# Patient Record
Sex: FEMALE | Race: OTHER | HISPANIC OR LATINO | Employment: UNEMPLOYED | URBAN - METROPOLITAN AREA
[De-identification: names, ages, dates, MRNs, and addresses within clinical notes are randomized per-mention and may not be internally consistent; named-entity substitution may affect disease eponyms.]

---

## 2021-05-23 ENCOUNTER — APPOINTMENT (EMERGENCY)
Dept: RADIOLOGY | Facility: HOSPITAL | Age: 26
End: 2021-05-23
Payer: COMMERCIAL

## 2021-05-23 ENCOUNTER — HOSPITAL ENCOUNTER (EMERGENCY)
Facility: HOSPITAL | Age: 26
Discharge: HOME | End: 2021-05-23
Admitting: EMERGENCY MEDICINE
Payer: COMMERCIAL

## 2021-05-23 VITALS
OXYGEN SATURATION: 97 % | DIASTOLIC BLOOD PRESSURE: 71 MMHG | SYSTOLIC BLOOD PRESSURE: 120 MMHG | HEART RATE: 104 BPM | TEMPERATURE: 98 F | RESPIRATION RATE: 18 BRPM | WEIGHT: 240.74 LBS

## 2021-05-23 DIAGNOSIS — S93.401A SPRAIN OF RIGHT ANKLE, UNSPECIFIED LIGAMENT, INITIAL ENCOUNTER: Primary | ICD-10-CM

## 2021-05-23 PROCEDURE — 73630 X-RAY EXAM OF FOOT: CPT

## 2021-05-23 PROCEDURE — 99284 EMERGENCY DEPT VISIT MOD MDM: CPT | Performed by: PHYSICIAN ASSISTANT

## 2021-05-23 PROCEDURE — 99283 EMERGENCY DEPT VISIT LOW MDM: CPT

## 2021-05-23 PROCEDURE — 73610 X-RAY EXAM OF ANKLE: CPT

## 2021-05-23 RX ORDER — ACETAMINOPHEN 325 MG/1
650 TABLET ORAL ONCE
Status: COMPLETED | OUTPATIENT
Start: 2021-05-23 | End: 2021-05-23

## 2021-05-23 RX ORDER — IBUPROFEN 600 MG/1
600 TABLET ORAL EVERY 6 HOURS PRN
Qty: 30 TABLET | Refills: 0 | Status: SHIPPED | OUTPATIENT
Start: 2021-05-23

## 2021-05-23 RX ADMIN — ACETAMINOPHEN 650 MG: 325 TABLET ORAL at 14:12

## 2021-05-23 NOTE — ED PROVIDER NOTES
History  Chief Complaint   Patient presents with   Anice Risen from the steps and twisted her right ankle     Pt feel down some steps has right ankle and foot pain , pt denies any other problems       History provided by:  Patient  Ankle Pain  Location:  Ankle and foot  Ankle location:  R ankle  Foot location:  R foot  Pain details:     Quality:  Aching    Radiates to:  Does not radiate    Severity:  Mild    Onset quality:  Sudden    Duration:  1 day    Timing:  Constant    Progression:  Unchanged  Chronicity:  New  Dislocation: no    Foreign body present:  No foreign bodies  Tetanus status:  Up to date  Prior injury to area:  No  Relieved by:  Nothing  Worsened by:  Nothing  Ineffective treatments:  None tried  Associated symptoms: no back pain and no neck pain    Risk factors: no concern for non-accidental trauma        None       History reviewed  No pertinent past medical history  History reviewed  No pertinent surgical history  History reviewed  No pertinent family history  I have reviewed and agree with the history as documented  E-Cigarette/Vaping    E-Cigarette Use Never User      E-Cigarette/Vaping Substances     Social History     Tobacco Use    Smoking status: Never Smoker    Smokeless tobacco: Never Used   Substance Use Topics    Alcohol use: Never     Frequency: Never    Drug use: Not Currently       Review of Systems   Constitutional: Negative  HENT: Negative  Eyes: Negative  Respiratory: Negative  Cardiovascular: Negative  Gastrointestinal: Negative  Endocrine: Negative  Genitourinary: Negative  Musculoskeletal: Negative  Negative for back pain and neck pain  Skin: Negative  Allergic/Immunologic: Negative  Neurological: Negative  Hematological: Negative  Psychiatric/Behavioral: Negative  All other systems reviewed and are negative  Physical Exam  Physical Exam  Vitals signs and nursing note reviewed     Constitutional:       Appearance: Normal appearance  She is normal weight  HENT:      Head: Normocephalic and atraumatic  Right Ear: Tympanic membrane, ear canal and external ear normal       Left Ear: Tympanic membrane, ear canal and external ear normal       Nose: Nose normal       Mouth/Throat:      Mouth: Mucous membranes are moist       Pharynx: Oropharynx is clear  Eyes:      Extraocular Movements: Extraocular movements intact  Conjunctiva/sclera: Conjunctivae normal       Pupils: Pupils are equal, round, and reactive to light  Neck:      Musculoskeletal: Normal range of motion and neck supple  Cardiovascular:      Rate and Rhythm: Normal rate and regular rhythm  Pulses: Normal pulses  Heart sounds: Normal heart sounds  Pulmonary:      Effort: Pulmonary effort is normal       Breath sounds: Normal breath sounds  Abdominal:      General: Abdomen is flat  Bowel sounds are normal       Palpations: Abdomen is soft  Musculoskeletal: Normal range of motion  Comments: Right ankle  Lateral and medial malleolus tender  An swelling  Dorsum tenderness   Ankle from toes from no laxity    Skin:     General: Skin is warm  Capillary Refill: Capillary refill takes less than 2 seconds  Neurological:      General: No focal deficit present  Mental Status: She is alert and oriented to person, place, and time     Psychiatric:         Mood and Affect: Mood normal          Vital Signs  ED Triage Vitals   Temperature Pulse Respirations Blood Pressure SpO2   05/23/21 1308 05/23/21 1308 05/23/21 1308 05/23/21 1308 05/23/21 1308   98 °F (36 7 °C) 104 18 120/71 97 %      Temp Source Heart Rate Source Patient Position - Orthostatic VS BP Location FiO2 (%)   05/23/21 1308 05/23/21 1308 05/23/21 1308 05/23/21 1308 --   Tympanic Monitor Sitting Left arm       Pain Score       05/23/21 1412       7           Vitals:    05/23/21 1308   BP: 120/71   Pulse: 104   Patient Position - Orthostatic VS: Sitting         Visual Acuity      ED Medications  Medications   acetaminophen (TYLENOL) tablet 650 mg (650 mg Oral Given 5/23/21 1412)       Diagnostic Studies  Results Reviewed     None                 XR ankle 3+ views RIGHT    (Results Pending)   XR foot 3+ views RIGHT    (Results Pending)              Procedures  Procedures         ED Course                             SBIRT 22yo+      Most Recent Value   SBIRT (22 yo +)   In order to provide better care to our patients, we are screening all of our patients for alcohol and drug use  Would it be okay to ask you these screening questions? No Filed at: 05/23/2021 1342                    MDM    Disposition  Final diagnoses:   Sprain of right ankle, unspecified ligament, initial encounter     Time reflects when diagnosis was documented in both MDM as applicable and the Disposition within this note     Time User Action Codes Description Comment    5/23/2021  2:23 PM Hu Nicholson  Add [S93 401A] Sprain of right ankle, unspecified ligament, initial encounter       ED Disposition     ED Disposition Condition Date/Time Comment    Discharge Stable Sun May 23, 2021  2:23 PM 4 [facility] discharge to home/self care  Follow-up Information     Follow up With Specialties Details Why Contact Info Additional Information    [address] [facility]   [address] [facility] [id number]  [address] [place] [address], [address] [place] [address], [id number] [phone]          Discharge Medication List as of 5/23/2021  2:25 PM      START taking these medications    Details   ibuprofen (MOTRIN) 600 mg tablet Take 1 tablet (600 mg total) by mouth every 6 (six) hours as needed for mild pain, Starting Sun 5/23/2021, Print           No discharge procedures on file      PDMP Review     None          ED Provider  Electronically Signed by           Mike Kruger Dioni Oviedo PA-C  05/23/21 1744

## 2021-05-24 ENCOUNTER — TELEPHONE (OUTPATIENT)
Dept: URGENT CARE | Facility: MEDICAL CENTER | Age: 26
End: 2021-05-24

## 2021-05-24 ENCOUNTER — HOSPITAL ENCOUNTER (EMERGENCY)
Facility: HOSPITAL | Age: 26
Discharge: HOME | End: 2021-05-24
Attending: EMERGENCY MEDICINE
Payer: COMMERCIAL

## 2021-05-24 ENCOUNTER — TELEPHONE (OUTPATIENT)
Dept: OBGYN CLINIC | Facility: HOSPITAL | Age: 26
End: 2021-05-24

## 2021-05-24 VITALS
OXYGEN SATURATION: 98 % | DIASTOLIC BLOOD PRESSURE: 76 MMHG | SYSTOLIC BLOOD PRESSURE: 121 MMHG | RESPIRATION RATE: 18 BRPM | TEMPERATURE: 98.7 F | HEART RATE: 92 BPM

## 2021-05-24 DIAGNOSIS — S82.891A ANKLE FRACTURE, RIGHT: Primary | ICD-10-CM

## 2021-05-24 PROCEDURE — 99283 EMERGENCY DEPT VISIT LOW MDM: CPT

## 2021-05-24 PROCEDURE — 99284 EMERGENCY DEPT VISIT MOD MDM: CPT | Performed by: PHYSICIAN ASSISTANT

## 2021-05-24 PROCEDURE — 29515 APPLICATION SHORT LEG SPLINT: CPT | Performed by: PHYSICIAN ASSISTANT

## 2021-05-24 NOTE — TELEPHONE ENCOUNTER
Contacted pt and talke to   Discussed radiology reading Pt will call ortho for follow and will return to er for splint and crutches today

## 2021-05-26 ENCOUNTER — OFFICE VISIT (OUTPATIENT)
Dept: OBGYN CLINIC | Facility: MEDICAL CENTER | Age: 26
End: 2021-05-26
Payer: COMMERCIAL

## 2021-05-26 ENCOUNTER — APPOINTMENT (OUTPATIENT)
Dept: RADIOLOGY | Facility: MEDICAL CENTER | Age: 26
End: 2021-05-26
Payer: COMMERCIAL

## 2021-05-26 VITALS
HEIGHT: 64 IN | HEART RATE: 89 BPM | DIASTOLIC BLOOD PRESSURE: 75 MMHG | BODY MASS INDEX: 39.95 KG/M2 | WEIGHT: 234 LBS | SYSTOLIC BLOOD PRESSURE: 113 MMHG

## 2021-05-26 DIAGNOSIS — S82.839A AVULSION FRACTURE OF DISTAL END OF FIBULA: Primary | ICD-10-CM

## 2021-05-26 DIAGNOSIS — S93.491A SPRAIN OF ANTERIOR TALOFIBULAR LIGAMENT OF RIGHT ANKLE, INITIAL ENCOUNTER: ICD-10-CM

## 2021-05-26 DIAGNOSIS — S82.839A AVULSION FRACTURE OF DISTAL END OF FIBULA: ICD-10-CM

## 2021-05-26 PROCEDURE — 99203 OFFICE O/P NEW LOW 30 MIN: CPT | Performed by: EMERGENCY MEDICINE

## 2021-05-26 PROCEDURE — 73590 X-RAY EXAM OF LOWER LEG: CPT

## 2021-05-26 NOTE — PATIENT INSTRUCTIONS
Please use the walking boot when ambulating, however you may take the boot off when relaxing or sleeping  Follow up in 2 weeks  Fractura de tobillo   LO QUE NECESITA SABER:   Ángel fractura de tobillo es la [place] de 1 o más huesos de campbell tobillo  INSTRUCCIONES SOBRE EL [facility]:   Llame al [employer] de emergencias local (911 en los [place]) en cualquiera de los siguientes casos:  · Usted se siente Lior, le hace falta el aire y tiene dolor de [place]  · Usted expectora lubna  Regrese a la jalil de emergencias si:  · Campbell pierna se siente cálida, sensible y adolorida  Se podría hussain inflamado y ruiz  · La lubna empapa el vendaje  · Usted tiene dolor intenso en el tobillo  · El yeso se siente muy apretado  · Campbell pie o dedos del pie están fríos o entumecidos  · El pie o las uñas del pie se ponen azules o grises  Llame a campbell médico si:  · Campbell férula se siente muy ajustada  · Campbell inflamación barnett aumentado o regresado  · Tiene fiebre  · El dolor no desaparece, incluso después del [place]  · Usted tiene preguntas o inquietudes acerca de campbell condición o cuidado  Medicamentos: Es posible que usted necesite alguno de los siguientes:  · Acetaminofén mitch el dolor y baja la fiebre  Está disponible sin receta médica  Pregunte la cantidad y la frecuencia con que debe tomarlos  [address]  Beena las etiquetas de todos los demás medicamentos que esté usando para saber si también contienen acetaminofén, o pregunte a campbell médico o farmacéutico  El acetaminofén puede causar daño en el hígado cuando no se ericka de forma correcta  No use más de 4 gramos (4000 miligramos) en total de acetaminofeno en un día  · [place], pee el ibuprofeno, [place] [facility] a disminuir la inflamación, el dolor y la [place]  [place] medicamento está disponible con o sin blanca receta médica  Los CECE pueden causar sangrado estomacal o problemas renales en ciertas personas   Si usted ericka un medicamento anticoagulante, siempre pregúntele a campbell médico si los CECE son seguros para usted  Siempre adiel la etiqueta de digna medicamento y [place] instrucciones  · Puede administrarse podrían administrarse  Pregunte al médico cómo debe juanis digna medicamento de forma bello  Algunos medicamentos recetados para el dolor contienen acetaminofén  No tome otros medicamentos que contengan acetaminofén sin consultarlo con campbell médico  Demasiado acetaminofeno puede causar daño al hígado  Los medicamentos recetados para el dolor podrían causar estreñimiento  Pregunte a campbell médico pee prevenir o tratar estreñimiento  · Bastian nori medicamentos pee se le haya indicado  Consulte con campbell médico si usted sheri que campbell medicamento no le está ayudando o si presenta efectos secundarios  Infórmele si es alérgico a cualquier medicamento  Mantenga blanca lista actualizada de los [place], las vitaminas y los productos herbales que ericka  Incluya los siguientes datos de los medicamentos: cantidad, frecuencia y motivo de administración  Traiga con usted la lista o los envases de las píldoras a nori citas de seguimiento  Lleve la lista de los medicamentos con usted en tone de blanca emergencia  Acuda a blanca consulta de control con campbell médico dentro de 1 a 2 días: Es posible que la fractura necesite reducirse (los [employer] se colocan en campbell sitio) o que [place]  Anote nori preguntas para que se acuerde de hacerlas ed nori visitas  Dispositivos de apoyo: Lacinda Bal un aparato ortopédico, un yeso o blanca férula para limitar campbell movimiento y proteger el tobillo  Es posible que usted necesite usar [employer] para proteger el tobillo y disminuir el dolor mientras se desplaza  No quite el dispositivo y no coloque peso sobre el tobillo lesionado  El cuidado de campbell aparato ortopédico y de campbell yeso: [place] el aparato ortopédico o el yeso antes de bañarse para que no se mojen   Ponga cinta adhesiva en 2 bolsas de plástico sobre campbell piel para cubrir por encima del yeso  Trate de mantener campbell tobillo fuera del agua el mayor tiempo posible  Descanse: Repose el tobillo para que pueda sanar  Regrese a nori actividades cotidianas según las indicaciones  Hielo: Aplique hielo sobre campbell tobillo por 15 a 20 minutos cada hora o pee se le indique  Use blanca compresa de hielo o ponga hielo triturado en blanca bolsa de plástico  Mohinder Boateng con blanca toalla  El hielo ayuda a evitar daño al tejido y a disminuir la inflamación y el dolor  EleveLangley Me campbell tobillo arriba del nivel del corazón tan seguido pee pueda  [place] va a disminuir inflamación y el dolor  Apoye el tobillo sobre almohadas o cobijas para mantenerlo elevado cómodamente  © Copyright [address] [place] [address] Drive Information is for End User's use only and may not be sold, redistributed or otherwise used for commercial purposes  All illustrations and images included in CareNotes® are the copyrighted property of [employer]  or [address] [place] [address] es sólo para uso en educación  Cmapbell intención no es darle un consejo médico sobre enfermedades o tratamientos  Colsulte con campbell Martínez Revering farmacéutico antes de seguir cualquier régimen médico para saber si es seguro y efectivo para usted

## 2021-05-26 NOTE — PROGRESS NOTES
Assessment/Plan:    Diagnoses and all orders for this visit:    Avulsion fracture of distal end of fibula  -     XR tibia fibula 2 vw right; Future  -     Cam Boot    Sprain of anterior talofibular ligament of right ankle, initial encounter  -     XR tibia fibula 2 vw right; Future  -     Cam Boot    Inversion injury small avulsion fx tip of distal fib  CAM boot for WBAT may come out of boot when relaxing  Xrays ankle and foot reviewed  Reviewed Xray tib/fib obtained today wnl  T/C PT    Return in about 2 weeks (around 6/9/2021)  Chief Complaint:     Chief Complaint   Patient presents with    Right Ankle - Pain       Subjective:   Patient ID: Clover Long is a 32 y o  female  New patient presents for right ankle inversion injury after missing the last 2 steps was evaluated in ER twice Xrays obtained and reviewed today she was placed in posterior orthoglass splint after second visit  C/o lateral pain and swelling  Review of Systems    The following portions of the patient's chart were reviewed and updated as appropriate: Allergy:    Allergies   Allergen Reactions    Pineapple - Food Allergy Swelling       History reviewed  No pertinent past medical history  History reviewed  No pertinent surgical history      Social History     Socioeconomic History    Marital status: /Civil Union     Spouse name: Not on file    Number of children: Not on file    Years of education: Not on file    Highest education level: Not on file   Occupational History    Not on file   Social Needs    Financial resource strain: Not on file    Food insecurity     Worry: Not on file     Inability: Not on file   [employer] needs     Medical: Not on file     Non-medical: Not on file   Tobacco Use    Smoking status: Never Smoker    Smokeless tobacco: Never Used   Substance and Sexual Activity    Alcohol use: Never     Frequency: Never    Drug use: Not Currently    Sexual activity: Not on file   Lifestyle    Physical activity     Days per week: Not on file     Minutes per session: Not on file    Stress: Not on file   Relationships    Social connections     Talks on phone: Not on file     Gets together: Not on file     Attends Taoism service: Not on file     Active member of club or organization: Not on file     Attends meetings of clubs or organizations: Not on file     Relationship status: Not on file    Intimate partner violence     Fear of current or ex partner: Not on file     Emotionally abused: Not on file     Physically abused: Not on file     Forced sexual activity: Not on file   Other Topics Concern    Not on file   Social History Narrative    Not on file       History reviewed  No pertinent family history  Medications:    Current Outpatient Medications:     ibuprofen (MOTRIN) 600 mg tablet, Take 1 tablet (600 mg total) by mouth every 6 (six) hours as needed for mild pain, Disp: 30 tablet, Rfl: 0    There is no problem list on file for this patient  Objective:  /75   Pulse 89   Ht 5' 4" (1 626 m)   Wt 106 kg (234 lb)   LMP 05/12/2021   BMI 40 17 kg/m²     Right Ankle Exam     Tenderness   The patient is experiencing tenderness in the ATF and lateral malleolus  Swelling: moderate    Range of Motion   Eversion: abnormal   Inversion: abnormal     Other   Erythema: absent  Sensation: normal  Pulse: present     Comments:  Ttp mid fibula            Physical Exam      Neurologic Exam    Procedures    I have personally reviewed the written report and IMAGES of the pertinent studies  RIGHT FOOT; RIGHT ANKLE  INDICATION:   pain  COMPARISON:  None     VIEWS:  XR FOOT 3+ VW RIGHT, XR ANKLE 3+ VW RIGHT      FINDINGS:     Small avulsion fracture involves the tip of the fibula    There is also a linear lucency involving the lateral aspect of the tibia near the growth arrest line seen only on the oblique view although there is some slight cortical irregularity noted on the   posterior aspect of the tibia on the lateral view      No significant degenerative changes      No lytic or blastic osseous lesion      Soft tissue swelling     IMPRESSION:     Small avulsion fracture the tip of the fibula      Possible nondisplaced fracture of the distal tibia laterally

## 2021-06-16 ENCOUNTER — OFFICE VISIT (OUTPATIENT)
Dept: OBGYN CLINIC | Facility: MEDICAL CENTER | Age: 26
End: 2021-06-16
Payer: COMMERCIAL

## 2021-06-16 VITALS
HEIGHT: 64 IN | WEIGHT: 234 LBS | BODY MASS INDEX: 39.95 KG/M2 | DIASTOLIC BLOOD PRESSURE: 79 MMHG | SYSTOLIC BLOOD PRESSURE: 125 MMHG | HEART RATE: 103 BPM

## 2021-06-16 DIAGNOSIS — S82.839A AVULSION FRACTURE OF DISTAL END OF FIBULA: Primary | ICD-10-CM

## 2021-06-16 DIAGNOSIS — S93.491D SPRAIN OF ANTERIOR TALOFIBULAR LIGAMENT OF RIGHT ANKLE, SUBSEQUENT ENCOUNTER: ICD-10-CM

## 2021-06-16 PROCEDURE — 99213 OFFICE O/P EST LOW 20 MIN: CPT | Performed by: EMERGENCY MEDICINE

## 2021-06-16 NOTE — PROGRESS NOTES
Assessment/Plan:    Diagnoses and all orders for this visit:    Avulsion fracture of distal end of fibula  -     Ankle Cude ankle/Ankle Brace    Sprain of anterior talofibular ligament of right ankle, subsequent encounter  -     Ankle Cude ankle/Ankle Brace    Used language line  Patient improving, will D/C CAM boot we have provided ankle brace    Return in about 4 weeks (around 7/14/2021)  Chief Complaint:     Chief Complaint   Patient presents with    Right Ankle - Fracture, Follow-up       Subjective:   Patient ID: Soheila Price is a 32 y o  female  DOI 5/23    Patient returns   CAM WBAT    Initial note:   New patient presents for right ankle inversion injury after missing the last 2 steps was evaluated in ER twice Xrays obtained and reviewed today she was placed in posterior orthoglass splint after second visit  C/o lateral pain and swelling  Review of Systems    The following portions of the patient's chart were reviewed and updated as appropriate: Allergy:    Allergies   Allergen Reactions    Pineapple - Food Allergy Swelling       History reviewed  No pertinent past medical history  History reviewed  No pertinent surgical history      Social History     Socioeconomic History    Marital status: /Civil Union     Spouse name: Not on file    Number of children: Not on file    Years of education: Not on file    Highest education level: Not on file   Occupational History    Not on file   Tobacco Use    Smoking status: Never Smoker    Smokeless tobacco: Never Used   Vaping Use    Vaping Use: Never used   Substance and Sexual Activity    Alcohol use: Never    Drug use: Not Currently    Sexual activity: Not on file   Other Topics Concern    Not on file   Social History Narrative    Not on file     Social Determinants of Health     Financial Resource Strain:     Difficulty of Paying Living Expenses:    Food Insecurity:     Worried About Running Out of Food in the Last Year:    [address] in the Last Year:    Transportation Needs:     Lack of Transportation (Medical):  Lack of Transportation (Non-Medical):    Physical Activity:     Days of Exercise per Week:     Minutes of Exercise per Session:    Stress:     Feeling of Stress :    Social Connections:     Frequency of Communication with Friends and Family:     Frequency of Social Gatherings with Friends and Family:     Attends Scientologist Services:     Active Member of Clubs or Organizations:     Attends Club or Organization Meetings:     Marital Status:    Intimate Partner Violence:     Fear of Current or Ex-Partner:     Emotionally Abused:     Physically Abused:     Sexually Abused:        History reviewed  No pertinent family history  Medications:    Current Outpatient Medications:     ibuprofen (MOTRIN) 600 mg tablet, Take 1 tablet (600 mg total) by mouth every 6 (six) hours as needed for mild pain, Disp: 30 tablet, Rfl: 0    There is no problem list on file for this patient  Objective:  /79   Pulse 103   Ht 5' 4" (1 626 m)   Wt 106 kg (234 lb)   LMP 05/12/2021   BMI 40 17 kg/m²     Right Ankle Exam     Tenderness   The patient is experiencing tenderness in the ATF  Swelling: mild    Range of Motion   The patient has normal right ankle ROM  Tests   Varus tilt: positive    Other   Erythema: absent  Sensation: normal     Comments: There is no tenderness to palpation of the lisfranc, and no plantar ecchymosis  There is no tenderness to palpation of the proximal and mid fibula  Physical Exam      Neurologic Exam    Procedures    I have personally reviewed the written report of the pertinent studies

## 2021-07-14 ENCOUNTER — OFFICE VISIT (OUTPATIENT)
Dept: OBGYN CLINIC | Facility: MEDICAL CENTER | Age: 26
End: 2021-07-14
Payer: COMMERCIAL

## 2021-07-14 VITALS
SYSTOLIC BLOOD PRESSURE: 96 MMHG | BODY MASS INDEX: 39.61 KG/M2 | HEART RATE: 91 BPM | DIASTOLIC BLOOD PRESSURE: 64 MMHG | WEIGHT: 232 LBS | HEIGHT: 64 IN

## 2021-07-14 DIAGNOSIS — S82.839A AVULSION FRACTURE OF DISTAL END OF FIBULA: Primary | ICD-10-CM

## 2021-07-14 DIAGNOSIS — S93.491D SPRAIN OF ANTERIOR TALOFIBULAR LIGAMENT OF RIGHT ANKLE, SUBSEQUENT ENCOUNTER: ICD-10-CM

## 2021-07-14 PROCEDURE — 99213 OFFICE O/P EST LOW 20 MIN: CPT | Performed by: EMERGENCY MEDICINE

## 2021-07-14 NOTE — PROGRESS NOTES
Assessment/Plan:    Diagnoses and all orders for this visit:    Avulsion fracture of distal end of fibula  -     Ambulatory referral to Physical Therapy; Future    Sprain of anterior talofibular ligament of right ankle, subsequent encounter  -     Ambulatory referral to Physical Therapy; Future    Patient improving, continue ankle brace PRN, PT for strengthening    Return in about 5 weeks (around 8/18/2021)  Chief Complaint:   No chief complaint on file  F/U right ankle injury    Subjective:   Patient ID: Ryan Vieira is a 32 y o  female  DOI 5/23    Patient returns 7 weeks out from injury, last eval D/C'd Cam and provided ankle brace  She will experience lateral pain with walking 5/10, however this does not keep her from ADLs  Initial note:   New patient presents for right ankle inversion injury after missing the last 2 steps was evaluated in ER twice Xrays obtained and reviewed today she was placed in posterior orthoglass splint after second visit  C/o lateral pain and swelling  Review of Systems    The following portions of the patient's chart were reviewed and updated as appropriate: Allergy:    Allergies   Allergen Reactions    Pineapple - Food Allergy Swelling       History reviewed  No pertinent past medical history  History reviewed  No pertinent surgical history      Social History     Socioeconomic History    Marital status: /Civil Union     Spouse name: Not on file    Number of children: Not on file    Years of education: Not on file    Highest education level: Not on file   Occupational History    Not on file   Tobacco Use    Smoking status: Never Smoker    Smokeless tobacco: Never Used   Vaping Use    Vaping Use: Never used   Substance and Sexual Activity    Alcohol use: Never    Drug use: Not Currently    Sexual activity: Not on file   Other Topics Concern    Not on file   Social History Narrative    Not on file     Social Determinants of Health     Financial Resource Strain:     Difficulty of Paying Living Expenses:    Food Insecurity:     Worried About Running Out of Food in the Last Year:     [address] in the Last Year:    Transportation Needs:     Lack of Transportation (Medical):  Lack of Transportation (Non-Medical):    Physical Activity:     Days of Exercise per Week:     Minutes of Exercise per Session:    Stress:     Feeling of Stress :    Social Connections:     Frequency of Communication with Friends and Family:     Frequency of Social Gatherings with Friends and Family:     Attends Rastafarian Services:     Active Member of Clubs or Organizations:     Attends Club or Organization Meetings:     Marital Status:    Intimate Partner Violence:     Fear of Current or Ex-Partner:     Emotionally Abused:     Physically Abused:     Sexually Abused:        History reviewed  No pertinent family history  Medications:    Current Outpatient Medications:     ibuprofen (MOTRIN) 600 mg tablet, Take 1 tablet (600 mg total) by mouth every 6 (six) hours as needed for mild pain, Disp: 30 tablet, Rfl: 0    There is no problem list on file for this patient  Objective:  BP 96/64   Pulse 91   Ht 5' 4" (1 626 m)   Wt 105 kg (232 lb)   LMP 05/12/2021   BMI 39 82 kg/m²     Right Ankle Exam     Tenderness   The patient is experiencing tenderness in the CF  Range of Motion   The patient has normal right ankle ROM  Muscle Strength   The patient has normal right ankle strength  Tests   Varus tilt: negative    Other   Erythema: absent  Sensation: normal  Pulse: present           Strength/Myotome Testing     Right Ankle/Foot   Normal strength      Physical Exam      Neurologic Exam    Procedures    I have personally reviewed the written report of the pertinent studies

## 2021-07-14 NOTE — PATIENT INSTRUCTIONS
Recommend Spenco arch supports (green and black) on [employer]  Wean into wearing them, as your feet will need to get use to them  Ejercicios para el tobillo   CUIDADO AMBULATORIO:   Lo que necesita saber acerca de los ejercicios para el tobillo: Los ejercicios para el tobillo ayudan a fortalecer campbell tobillo y a mejorar campbell función después de blanca Kerry Rucks  Estos son Dyan Joni básicos  Pregúntele a campbell médico si usted necesita acudir con un fisioterapeuta para que le indique ejercicios más avanzado  · Realice estos ejercicios entre 3 a 5 días a la [place] , o según las indicaciones de campbell médico  Pregunte si debería realizar los ejercicios con ambos tobillos  · Realice los ejercicios en el orden que le recomiende campbell médico para prevenir la inflamación, el dolor crónico y volverse a lesionar  Empiece con los ejercicios del rango de [place]  Deborah Robert a los ejercicios de fortalecimiento y finalmente a los de ejercicios de estabilidad  · Entre en calor antes de hacer los ejercicios para el tobillo  Camine o monte en blanca bicicleta estática ed 5 a 10 minutos para preparase a  campbell tobillo  · Deténgase si siente dolor  Es normal que sienta cierta molestia al principio  Practicar los ejercicios con regularidad ayudará a disminuir campbell incomodidad con el paso del [place]  Cómo realizar los ejercicios de rango de movimiento de forma bello: Empiece con los ejercicios del rango de movimiento para mejorar la flexibilidad  Pregúntele a campbell médico cuándo puede comenzar a realizar los ejercicios de fortalecimiento  · El abecedario con el tobillo: Siéntese en blanca silla en la cual nori pies no toquen el piso  Utilice campbell dedo ronit del pie para trazar cada letra del abecedario  Utilice sólo campbell pie y tobillo y [employer] movimientos pequeños  Willis 2 series  · Estiramiento de la pantorrilla:     ? Sentado estiramiento para la pantorrilla con blanca toalla   Siéntese en el [address] [place] suyo  Pase blanca toalla en forma de U alrededor de la planta del pie lesionado  Agarre el final de cada extremo de la toalla y tráigala hacia el tronco  Mantenga campbell pierna y espalda derecha  No se incline hacia clay mientras albertina de la toalla  Sostenga está posición por 30 segundos  Luego relaje por 30 segundos  Realice 2 series de 10          ? De pie, estiramiento de la pantorrilla: Párese frente a blanca pared con el pie rakesh hacia clay y la rodilla ligeramente doblada  Mantenga la pierna del pie lesionado extendida y detrás suyo con los dedos de los pies apuntando un poco [place]  [place] los dos talones contra el piso y presione nori caderas hacia clay  Mantenga campbell espalda derecha sin arquearla  Sostenga por 30 segundos  Luego relaje por 30 segundos  Realice 2 series de 10  Repita con la pierna doblada  Realice 2 series de 10  Cómo realizar ejercicios de fortalecimiento de manera bello: Después que usted pueda realizar los ejercicios del rango de movimiento sin dolor, puede empezar con los ejercicios de fortalecimiento  Pregúntele a campbell médico cuándo puede comenzar con los ejercicios de estabilidad o equilibrio  · Movimientos del tobillo en 4 direcciones: Siéntese en el piso con las piernas enfrente suyo  [place] soporte [employer] talones en el suelo  ? Flexión dorsal: Empiece con los dedos de los pies mirando [place]  Traiga nori dedos de los pies [place] campbell cuerpo  Despacio regrese a la posición inicial  Realice 3 series de 5     ? Flexión plantar: Empiece con los dedos de los pies mirando [employer]  Empuje nori dedos hacia adelante de usted  Despacio regrese a la posición inicial  Realice 3 series de 5          ? Inversión: Empiece con los dedos de los pies mirando [employer]  Mueva nori dedos [employer], mirándose el jeremy al [place]  Despacio regrese a la posición inicial  Realice 3 series de 5     ? Eversión: [employer] con los dedos de los pies mirando [place] arriba   Mueva nori dedos hacia afuera, alejados el jeremy del [place]  Despacio regrese a la posición inicial  Realice 3 series de 5  · Flexión de los dedos del pie con blanca toalla: Sentado en un silla con los dos pies contra el piso  Coloque blanca toalla pequeña en el piso enfrente de campbell pie lesionado  Agarre el centro de la toalla con los dedos de pie y traiga la toalla [place] usted  Relájese y repita  Willis 1 serie de 5          · Levantar las canicas: Sentado en un silla con los dos pies contra el piso  Coloque 20 canicas en el piso enfrente de campbell pie lesionado  Utilice nori dedos para levantar blanca canica a la vez para colocarla dentro de blanca vasija  Repita hasta que haya recogido todas las canicas  Realice 1 serie  · Levantamiento de talones:     ? Levantada de talones: De pie con el peso de campbell cuerpo distribuido igualmente en ambos pies  Agárrese del espaldar de blanca silla o de blanca pared para mantener el equilibrio  Levante el pie rakesh del piso para que campbell peso se vuelque al pie lesionado  Levante del piso el talón de campbell pie lesionado tanto pee pueda  Despacio baje campbell talón al piso  Willis 1 serie de 10          ? Doble levantada de los talones: De pie con el peso de campbell cuerpo distribuido igualmente en ambos pies  Agárrese del espaldar de blanca silla o de blanca pared para mantener el equilibrio  Levante del piso nori dos talones tanto pee pueda  Despacio baje nori talones al piso  Willis 1 serie de 10  · Caminar sobre el talón y en puntillas:     ? Caminar sobre el talón: Empezar en posición de pie  Levante nori dedos de los pies y camine sobre nori [place]  Mantenga los dedos levantados tanto pee le sea posible  Realice 2 series de 10          ? Caminar en puntillas: Empezar en posición de pie  Levante nori talones del piso y camine sobre la planta de nori dedos de los pies  Mantenga nori talones levantados [employer] sea posible  Realice 2 series de 10         Cómo realizar un ejercicio de equilibrio de forma bello: Después que [facility] [employer] ejercicios de acondicionamiento sin dolor, usted puede empezar con estos ejercicios básicos de estabilidad  [place] a campbell médico ejercicios de estabilidad más avanzados  · Levantamiento de blanca sridevi pierna: De pie con el peso de campbell cuerpo distribuido igualmente en ambos pies o agárrese del espaldar de blanca silla o de blanca pared  No se incline hacia un lado  Levante el pie rakesh del piso para que campbell peso se vuelque al pie lesionado  Sostenga el equilibrio sobre campbell [employer]  Pregúntele a campbell médico por cuánto tiempo tiene que [employer]  Comuníquese con campbell médico si:  · Campbell dolor empeora  · Usted tiene un dolor nuevo  · Usted tiene preguntas o inquietudes acerca de campbell afección, cuidado o programa de ejercicios  © Copyright [address] [place] [address] Information is for End User's use only and may not be sold, redistributed or otherwise used for commercial purposes  All illustrations and images included in CareNotes® are the copyrighted property of [employer]  or [address] [place] [address] es sólo para uso en educación  Campbell intención no es darle un consejo médico sobre enfermedades o tratamientos  Colsulte con campbell Caroline Sergio farmacéutico antes de seguir cualquier régimen médico para saber si es seguro y efectivo para usted

## 2021-07-19 ENCOUNTER — EVALUATION (OUTPATIENT)
Dept: PHYSICAL THERAPY | Facility: CLINIC | Age: 26
End: 2021-07-19
Payer: COMMERCIAL

## 2021-07-19 DIAGNOSIS — S93.491D SPRAIN OF ANTERIOR TALOFIBULAR LIGAMENT OF RIGHT ANKLE, SUBSEQUENT ENCOUNTER: ICD-10-CM

## 2021-07-19 DIAGNOSIS — S82.839A AVULSION FRACTURE OF DISTAL END OF FIBULA: Primary | ICD-10-CM

## 2021-07-19 PROCEDURE — 97110 THERAPEUTIC EXERCISES: CPT | Performed by: PHYSICAL THERAPIST

## 2021-07-19 PROCEDURE — 97161 PT EVAL LOW COMPLEX 20 MIN: CPT | Performed by: PHYSICAL THERAPIST

## 2021-07-19 NOTE — PROGRESS NOTES
PT Evaluation     Today's date: 2021  Patient name: Lizabeth Garcia  : 1995  MRN: [id number]  Referring provider: Kevni Vargas MD  Dx:   Encounter Diagnosis     ICD-10-CM    1  Avulsion fracture of distal end of fibula  S82 839A Ambulatory referral to Physical Therapy   2  Sprain of anterior talofibular ligament of right ankle, subsequent encounter  S93 [id number] Ambulatory referral to Physical Therapy                  Assessment  Assessment details: Lizabeth Garcia is a pleasant 32 y o  female who presents with signs and symptoms correlating with referring diagnosis  No further referral appears necessary at this time based upon examination results  The patient's greatest concerns are decreasing pain throughout the day and ensuring that she is progressing to  She presents with a movement impairment diagnosis of hypomobile DF assistance  This also presents with decreased PF ROM 2/2 pain in the anterior portion of the TCJ, good strength, and overall balance with and without the splint  Negative prognostic indicators: high copay  Positive prognostic indicators: good motivation  Please contact me if you have any further questions or recommendations  Thank you very much for the kind referral       Impairments: abnormal or restricted ROM, abnormal movement, activity intolerance, impaired physical strength and pain with function    Symptom irritability: lowUnderstanding of Dx/Px/POC: good   Prognosis: good    Goals  STGs  1  Decrease pain by 30% in 2-4 weeks  2  Improve ankle ROM by 5 degrees in 2-4 weeks  3  Improve ankle strength by 1/3 grade in 2-4 weeks  LTGs  1  Decrease pain by 60% in 6-8 weeks  2  Improve walking tolerance to >30 minutes in 6-8 weeks  3  Perform job activities without pain in 6-8 weeks          Plan  Patient would benefit from: skilled physical therapy  Referral necessary: No  Planned modality interventions: cryotherapy, TENS and thermotherapy: hydrocollator packs  Planned therapy interventions: manual therapy, therapeutic training, stretching, strengthening, therapeutic activities, therapeutic exercise, patient education, activity modification, neuromuscular re-education and home exercise program  Frequency: 2x month  Duration in weeks: 5  Treatment plan discussed with: patient        Subjective Evaluation    History of Present Illness  Mechanism of injury: Pt is a 32 y o  female presenting w/ R ankle sprain and avulsion fracture of distal fibula following a fall down her stairs 2 months ago  She states that since then the ankle has been feeling substantially better and is able to ambulate with and without the splint  She is ambulating, and occasionally is having increased pain while walking  She occasionally notices swelling, but Is not everyday       Neurological signs: none   Red Flags: none   PMH: none          Not a recurrent problem   Quality of life: good    Pain  Current pain rating: 3  At best pain rating: 3  At worst pain rating: 3  Location: lateral ankle and anterior TCJ   Quality: sharp  Relieving factors: ice  Aggravating factors: walking, standing and stair climbing  Progression: improved    Social Support  Steps to enter house: yes (2nd floor)    Employment status: not working  Patient Goals  Patient goals for therapy: increased strength, decreased pain, increased motion and independence with ADLs/IADLs          Objective     Active Range of Motion   Left Ankle/Foot   Dorsiflexion (kf): 4 degrees   Plantar flexion: 46 degrees   Inversion: 30 degrees   Eversion: 20 degrees     Right Ankle/Foot   Dorsiflexion (kf): 0 degrees   Plantar flexion: 38 degrees with pain  Inversion: 23 degrees   Eversion: 18 degrees     Additional Active Range of Motion Details  Observation: no swelling in the ankle at this time  Gait: normal ambulation pattern       Strength/Myotome Testing     Left Ankle/Foot   Dorsiflexion: 4+  Plantar flexion: 4+  Inversion: 4+  Eversion: 4+    Right Ankle/Foot   Dorsiflexion: 4+  Plantar flexion: 4+  Inversion: 4+  Eversion: 4-    Tests     Right Ankle/Foot   Positive for anterior drawer                Precautions: none    Daily Treatment Diary    Date 7/19       Visit Number 1       FOTO IE       Re-Eval IE          Manuals    DF and PA oscillation MK                                Neuro Re-Ed     Doming  HEP       Towel curls        HR/TR HEP       SLS with foam                                 Ther Ex    TM         Ankle TB 4 way  HEP       Slant board         lunge        Squats                                Ther Activity    Steps                                 Modalities

## 2022-09-20 ENCOUNTER — OFFICE VISIT (OUTPATIENT)
Dept: FAMILY MEDICINE CLINIC | Facility: CLINIC | Age: 27
End: 2022-09-20

## 2022-09-20 VITALS
SYSTOLIC BLOOD PRESSURE: 110 MMHG | TEMPERATURE: 97.1 F | RESPIRATION RATE: 19 BRPM | BODY MASS INDEX: 42.51 KG/M2 | WEIGHT: 249 LBS | DIASTOLIC BLOOD PRESSURE: 76 MMHG | HEART RATE: 83 BPM | OXYGEN SATURATION: 97 % | HEIGHT: 64 IN

## 2022-09-20 DIAGNOSIS — E66.01 CLASS 3 SEVERE OBESITY DUE TO EXCESS CALORIES WITHOUT SERIOUS COMORBIDITY WITH BODY MASS INDEX (BMI) OF 40.0 TO 44.9 IN ADULT (HCC): ICD-10-CM

## 2022-09-20 DIAGNOSIS — Z59.9 FINANCIAL DIFFICULTIES: ICD-10-CM

## 2022-09-20 DIAGNOSIS — Z00.00 PHYSICAL EXAM: Primary | ICD-10-CM

## 2022-09-20 PROBLEM — E66.813 CLASS 3 SEVERE OBESITY DUE TO EXCESS CALORIES WITHOUT SERIOUS COMORBIDITY WITH BODY MASS INDEX (BMI) OF 40.0 TO 44.9 IN ADULT (HCC): Status: ACTIVE | Noted: 2022-09-20

## 2022-09-20 PROCEDURE — 99203 OFFICE O/P NEW LOW 30 MIN: CPT | Performed by: FAMILY MEDICINE

## 2022-09-20 PROCEDURE — 3725F SCREEN DEPRESSION PERFORMED: CPT | Performed by: FAMILY MEDICINE

## 2022-09-20 SDOH — ECONOMIC STABILITY - INCOME SECURITY: PROBLEM RELATED TO HOUSING AND ECONOMIC CIRCUMSTANCES, UNSPECIFIED: Z59.9

## 2022-09-20 NOTE — PROGRESS NOTES
[address] [facility]    NAME: Elza Johnson  AGE: 32 y o  SEX: female  : 1995     DATE: 2022     Assessment and Plan:     Problem List Items Addressed This Visit        Other    Financial difficulties    Relevant Orders    Ambulatory referral to social work care management program    Class 3 severe obesity due to excess calories without serious comorbidity with body mass index (BMI) of 40 0 to 44 9 in adult [facility])     Assessment:   BMI 42 74, obese class 3   Goal: BMI between 18 5 to 25  Plan  Will refer the patient to weight management  Encourage the patient to loose at least 5 pounds before next visit  Counseled patient on the importance of working to achieve weight reduction goal  Discussed benefits of weight loss including prevention or control of comorbidities  Discussed role that balanced diet and daily activity play in weight reduction  Set up small attainable weight loss goals  Involve family, friends, and co-workers for support  Exercise should be 30 minutes 5 times weekly of moderate intensity activity, brisk walking acceptable  Recommended diet include mediterranean and DASH  Primary focus should be unprocessed foods, fresh fruits &  vegetables, plant based fats and protein, legumes, whole grain and nuts  Vegetables and fruit should make up 1/2 each meal  Limit red meats, fast food and eating out at restaurants  Relevant Orders    Ambulatory Referral to Weight Management    Comprehensive metabolic panel    Lipid panel      Other Visit Diagnoses     Physical exam    -  Primary          Immunizations and preventive care screenings were discussed with patient today  Appropriate education was printed on patient's after visit summary      Counseling:  Alcohol/drug use: discussed moderation in alcohol intake, the recommendations for healthy alcohol use, and avoidance of illicit drug use   Dental Health: discussed importance of regular tooth brushing, flossing, and dental visits  Injury prevention: discussed safety/seat belts, safety helmets, smoke detectors, carbon dioxide detectors, and smoking near bedding or upholstery  Sexual health: discussed sexually transmitted diseases, partner selection, use of condoms, avoidance of unintended pregnancy, and contraceptive alternatives  · Exercise: the importance of regular exercise/physical activity was discussed  Recommend exercise 3-5 times per week for at least 30 minutes  Return in about 3 months (around 12/20/2022) for bmi follow up  Chief Complaint:     Chief Complaint   Patient presents with   [facility] Patient Visit     Np here for physical       History of Present Illness:     Adult Annual Physical   It was a pleasure to see@ is a 32 y o  @ no-known past medical hx who presents for an annual physical exam and to establish care  Reports no complaints today  Denies unintentional weight loss, fever, chills, fatigue, weakness, headaches, dizziness, rashes, blurred vision, nausea, palpitation, chest pain, SOB, abdominal pain, urinary changes, bowel changes, legs swelling/pain, sleep problems,  sick contacts or recent travel  Patient's medical conditions are stable unless noted otherwise above  Patient has not had any recent hospitalizations, or medical emergencies since last visit  Does not take any medications   Overall patient reports feeling well  Patient has no further complaints other than what is mentioned in the ROS  Denies tobacco, alcohol and illicit drug consumption, she is currently unemployed, lives with  and son  Diet and Physical Activity  · Diet/Nutrition: poor diet  · Exercise: moderate cardiovascular exercise        Depression Screening  PHQ-2/9 Depression Screening    Little interest or pleasure in doing things: 0 - not at all  Feeling down, depressed, or hopeless: 0 - not at all  PHQ-2 Score: 0  PHQ-2 Interpretation: Negative depression screen       General Health  · Sleep: sleeps well  · Hearing: normal - bilateral   · Vision: no vision problems  · Dental: regular dental visits  Review of Systems:     Review of Systems   Constitutional: Negative for activity change, appetite change, chills, fatigue and fever  HENT: Negative for congestion, postnasal drip, rhinorrhea, sneezing and sore throat  Eyes: Negative for visual disturbance  Respiratory: Negative for cough, chest tightness, shortness of breath and wheezing  Cardiovascular: Negative for chest pain, palpitations and leg swelling  Gastrointestinal: Negative for abdominal distention, abdominal pain, blood in stool, constipation, diarrhea, nausea and vomiting  Genitourinary: Negative for difficulty urinating, dysuria, hematuria, menstrual problem, vaginal bleeding and vaginal discharge  Musculoskeletal: Negative for arthralgias and myalgias  Skin: Negative for rash  Neurological: Negative for dizziness, syncope, weakness, light-headedness, numbness and headaches  Psychiatric/Behavioral: Negative for agitation, behavioral problems and suicidal ideas  Past Medical History:     History reviewed  No pertinent past medical history  Past Surgical History:     History reviewed  No pertinent surgical history     Social History:     Social History     Socioeconomic History    Marital status: /Civil Union     Spouse name: None    Number of children: None    Years of education: None    Highest education level: None   Occupational History    None   Tobacco Use    Smoking status: Never Smoker    Smokeless tobacco: Never Used   Vaping Use    Vaping Use: Never used   Substance and Sexual Activity    Alcohol use: Never    Drug use: Not Currently    Sexual activity: None   Other Topics Concern    None   Social History Narrative    None     Social Determinants of Health     Financial Resource Strain: Medium Risk    Difficulty of Paying Living Expenses: Somewhat hard   Food Insecurity: No Food Insecurity    Worried About Running Out of Food in the Last Year: Never true    Ran Out of Food in the Last Year: Never true   Transportation Needs: No Transportation Needs    Lack of Transportation (Medical): No    Lack of Transportation (Non-Medical): No   Physical Activity: Not on file   Stress: Not on file   Social Connections: Not on file   Intimate Partner Violence: Not on file   Housing Stability: Not on file      Family History:     Family History   Problem Relation Age of Onset    Hypertension Father     Diabetes Maternal Grandmother     Diabetes Maternal Grandfather       Current Medications:     Current Outpatient Medications   Medication Sig Dispense Refill    ibuprofen (MOTRIN) 600 mg tablet Take 1 tablet (600 mg total) by mouth every 6 (six) hours as needed for mild pain 30 tablet 0     No current facility-administered medications for this visit  Allergies: Allergies   Allergen Reactions    Pineapple - Food Allergy Swelling      Physical Exam:     /76 (BP Location: Left arm, Patient Position: Sitting, Cuff Size: Adult)   Pulse 83   Temp (!) 97 1 °F (36 2 °C) (Temporal)   Resp 19   Ht 5' 4" (1 626 m)   Wt 113 kg (249 lb)   LMP 09/18/2022   SpO2 97%   BMI 42 74 kg/m²     Physical Exam  Vitals and nursing note reviewed  Constitutional:       General: She is awake  She is not in acute distress  Appearance: Normal appearance  She is well-developed and well-groomed  She is not ill-appearing, toxic-appearing or diaphoretic  HENT:      Head: Normocephalic and atraumatic  Nose: Nose normal       Mouth/Throat:      Mouth: Mucous membranes are moist       Pharynx: Oropharynx is clear  No oropharyngeal exudate or posterior oropharyngeal erythema  Eyes:      Extraocular Movements: Extraocular movements intact        Conjunctiva/sclera: Conjunctivae normal       Pupils: Pupils are equal, round, and reactive to light  Cardiovascular:      Rate and Rhythm: Normal rate and regular rhythm  No extrasystoles are present  Pulses: Normal pulses  Radial pulses are 2+ on the right side and 2+ on the left side  Heart sounds: Normal heart sounds, S1 normal and S2 normal    Pulmonary:      Effort: Pulmonary effort is normal       Breath sounds: Normal breath sounds and air entry  Abdominal:      General: Bowel sounds are normal       Palpations: Abdomen is soft  There is no mass  Tenderness: There is no abdominal tenderness  There is no right CVA tenderness, left CVA tenderness or guarding  Musculoskeletal:         General: Normal range of motion  Cervical back: Normal range of motion  Right lower leg: No edema  Left lower leg: No edema  Skin:     General: Skin is warm  Capillary Refill: Capillary refill takes less than 2 seconds  Coloration: Skin is not jaundiced or pale  Findings: No bruising, erythema, lesion or rash  Neurological:      General: No focal deficit present  Mental Status: She is alert  Psychiatric:         Behavior: Behavior is cooperative           MD Bishnu Paul

## 2022-09-20 NOTE — ASSESSMENT & PLAN NOTE
Assessment:   BMI 42 74, obese class 3   Goal: BMI between 18 5 to 25  Plan  Will refer the patient to weight management  Encourage the patient to loose at least 5 pounds before next visit  Counseled patient on the importance of working to achieve weight reduction goal  Discussed benefits of weight loss including prevention or control of comorbidities  Discussed role that balanced diet and daily activity play in weight reduction  Set up small attainable weight loss goals  Involve family, friends, and co-workers for support  Exercise should be 30 minutes 5 times weekly of moderate intensity activity, brisk walking acceptable  Recommended diet include mediterranean and DASH  Primary focus should be unprocessed foods, fresh fruits &  vegetables, plant based fats and protein, legumes, whole grain and nuts  Vegetables and fruit should make up 1/2 each meal  Limit red meats, fast food and eating out at restaurants

## 2022-10-20 ENCOUNTER — PATIENT OUTREACH (OUTPATIENT)
Dept: FAMILY MEDICINE CLINIC | Facility: CLINIC | Age: 27
End: 2022-10-20

## 2022-10-20 NOTE — PROGRESS NOTES
MILES MCKENZIE received referral for Pt due to possible SDOH needs  MILES MCKENZIE contacted Pt utilizing [employer] [id number]  MILES MCKENZIE introduced self and role and discussed reason for referral  Pt denied needs for food insecurity, transportation or financial assistance  Pt also denied any difficulties or needs for additional resources  MILES MCKENZIE encouraged Pt that if anything should arise to reach out to [facility] and Pt was thankful

## 2023-08-10 ENCOUNTER — APPOINTMENT (OUTPATIENT)
Dept: LAB | Age: 28
End: 2023-08-10

## 2023-08-10 DIAGNOSIS — Z00.8 HEALTH EXAMINATION IN POPULATION SURVEY: ICD-10-CM

## 2023-08-10 PROCEDURE — 36415 COLL VENOUS BLD VENIPUNCTURE: CPT

## 2023-08-10 PROCEDURE — 86480 TB TEST CELL IMMUN MEASURE: CPT

## 2023-08-14 LAB
GAMMA INTERFERON BACKGROUND BLD IA-ACNC: 0.03 IU/ML
M TB IFN-G BLD-IMP: NEGATIVE
M TB IFN-G CD4+ BCKGRND COR BLD-ACNC: 0 IU/ML
M TB IFN-G CD4+ BCKGRND COR BLD-ACNC: 0 IU/ML
MITOGEN IGNF BCKGRD COR BLD-ACNC: >10 IU/ML

## 2023-09-18 ENCOUNTER — APPOINTMENT (OUTPATIENT)
Dept: LAB | Facility: CLINIC | Age: 28
End: 2023-09-18

## 2023-09-18 ENCOUNTER — OFFICE VISIT (OUTPATIENT)
Dept: FAMILY MEDICINE CLINIC | Facility: CLINIC | Age: 28
End: 2023-09-18

## 2023-09-18 VITALS
WEIGHT: 255 LBS | HEIGHT: 64 IN | TEMPERATURE: 97.3 F | OXYGEN SATURATION: 98 % | RESPIRATION RATE: 16 BRPM | DIASTOLIC BLOOD PRESSURE: 70 MMHG | BODY MASS INDEX: 43.54 KG/M2 | HEART RATE: 96 BPM | SYSTOLIC BLOOD PRESSURE: 110 MMHG

## 2023-09-18 DIAGNOSIS — E66.01 CLASS 3 SEVERE OBESITY DUE TO EXCESS CALORIES WITHOUT SERIOUS COMORBIDITY WITH BODY MASS INDEX (BMI) OF 40.0 TO 44.9 IN ADULT (HCC): ICD-10-CM

## 2023-09-18 DIAGNOSIS — E66.01 CLASS 3 SEVERE OBESITY DUE TO EXCESS CALORIES WITHOUT SERIOUS COMORBIDITY WITH BODY MASS INDEX (BMI) OF 40.0 TO 44.9 IN ADULT (HCC): Primary | ICD-10-CM

## 2023-09-18 LAB
ALBUMIN SERPL BCP-MCNC: 4 G/DL (ref 3.5–5)
ALP SERPL-CCNC: 112 U/L (ref 34–104)
ALT SERPL W P-5'-P-CCNC: 30 U/L (ref 7–52)
ANION GAP SERPL CALCULATED.3IONS-SCNC: 7 MMOL/L
AST SERPL W P-5'-P-CCNC: 26 U/L (ref 13–39)
BILIRUB SERPL-MCNC: 0.31 MG/DL (ref 0.2–1)
BUN SERPL-MCNC: 9 MG/DL (ref 5–25)
CALCIUM SERPL-MCNC: 9 MG/DL (ref 8.4–10.2)
CHLORIDE SERPL-SCNC: 103 MMOL/L (ref 96–108)
CHOLEST SERPL-MCNC: 126 MG/DL
CO2 SERPL-SCNC: 27 MMOL/L (ref 21–32)
CREAT SERPL-MCNC: 0.7 MG/DL (ref 0.6–1.3)
GFR SERPL CREATININE-BSD FRML MDRD: 118 ML/MIN/1.73SQ M
GLUCOSE P FAST SERPL-MCNC: 92 MG/DL (ref 65–99)
HDLC SERPL-MCNC: 34 MG/DL
LDLC SERPL CALC-MCNC: 76 MG/DL (ref 0–100)
NONHDLC SERPL-MCNC: 92 MG/DL
POTASSIUM SERPL-SCNC: 4 MMOL/L (ref 3.5–5.3)
PROT SERPL-MCNC: 7.8 G/DL (ref 6.4–8.4)
SODIUM SERPL-SCNC: 137 MMOL/L (ref 135–147)
T4 FREE SERPL-MCNC: 0.92 NG/DL (ref 0.61–1.12)
TRIGL SERPL-MCNC: 79 MG/DL
TSH SERPL DL<=0.05 MIU/L-ACNC: 2.07 UIU/ML (ref 0.45–4.5)

## 2023-09-18 PROCEDURE — 99213 OFFICE O/P EST LOW 20 MIN: CPT | Performed by: FAMILY MEDICINE

## 2023-09-18 PROCEDURE — 80061 LIPID PANEL: CPT

## 2023-09-18 PROCEDURE — 80053 COMPREHEN METABOLIC PANEL: CPT

## 2023-09-18 PROCEDURE — 84439 ASSAY OF FREE THYROXINE: CPT

## 2023-09-18 PROCEDURE — 36415 COLL VENOUS BLD VENIPUNCTURE: CPT

## 2023-09-18 PROCEDURE — 84443 ASSAY THYROID STIM HORMONE: CPT

## 2023-09-18 NOTE — PROGRESS NOTES
Name: Verena Jarvis      : 1995      MRN: [id number]  Encounter Provider: Andrae Brooke MD  Encounter Date: 2023   Encounter department: [address] [facility],[address]     1. Class 3 severe obesity due to excess calories without serious comorbidity with body mass index (BMI) of 40.0 to 44.9 in adult [facility])  Assessment & Plan:  Assessment:   BMI 43.77, previous BMI 42.74, morbid obese  Goal: BMI between 18.5 to 25. Previously referred to weight management. Recommended life style changes at her previous visit. Reports poor compliance with previous recommendations. Plan  Will refer the patient to weight management once again per patient request   Will start metformin 1000 mg bid and reassess at next visit   Encourage the patient to loose at least 5 pounds before next visit  Counseled patient on the importance of working to achieve weight reduction goal.   Discussed benefits of weight loss including prevention or control of comorbidities. Discussed role that balanced diet and daily activity play in weight reduction. Set up small attainable weight loss goals. Involve family, friends, and co-workers for support. Exercise should be 30 minutes 5 times weekly of moderate intensity activity, brisk walking acceptable. Recommended diet include mediterranean and DASH. Primary focus should be unprocessed foods, fresh fruits & vegetables, plant based fats and protein, legumes, whole grain and nuts. Limit red meats, fast food and eating out at restaurants. Orders:  -     metFORMIN (GLUCOPHAGE) 1000 MG tablet; Take 1 tablet (1,000 mg total) by mouth 2 (two) times a day with meals  -     TSH + Free T4; Future  -     Ambulatory Referral to Weight Management; Future         Subjective      It was a pleasure to [address] [place] [address]. Moreno, 32 y.o. female last seen 1 year ago.  Reports that vas living in [facility], returned recently about 2 months ago. Previously referred to weight management during her last visit, patient reports that did not follow recommendations bc was not living here in the [facility]. Today she presents today requesting to be referred  to a nutritionist.     Reports no complaints today. Denies unintentional weight loss, fever, chills, fatigue, weakness, headaches, dizziness, rashes, blurred vision, nausea, palpitation, chest pain, SOB, abdominal pain, urinary changes, bowel changes, legs swelling/pain, sleep problems,  sick contacts or recent travel  Patient's medical conditions are stable unless noted otherwise above. Patient has not had any recent hospitalizations, or medical emergencies since last visit. Does not take any medications   Overall patient reports feeling well. Patient has no further complaints other than what is mentioned in the ROS. Denies tobacco, alcohol and illicit drug consumption, she is currently unemployed, lives with  and son. Review of Systems   Constitutional: Negative for activity change, appetite change, chills, fatigue and fever. HENT: Negative for congestion, postnasal drip, rhinorrhea, sneezing and sore throat. Eyes: Negative for visual disturbance. Respiratory: Negative for cough, chest tightness, shortness of breath and wheezing. Cardiovascular: Negative for chest pain, palpitations and leg swelling. Gastrointestinal: Negative for abdominal distention, abdominal pain, blood in stool, constipation, diarrhea, nausea and vomiting. Genitourinary: Negative for difficulty urinating, dysuria, hematuria, menstrual problem, vaginal bleeding and vaginal discharge. Musculoskeletal: Negative for arthralgias and myalgias. Skin: Negative for rash. Neurological: Negative for dizziness, syncope, weakness, light-headedness, numbness and headaches. Psychiatric/Behavioral: Negative for agitation, behavioral problems and suicidal ideas.        Current Outpatient Medications on File Prior to Visit   Medication Sig   • ibuprofen (MOTRIN) 600 mg tablet Take 1 tablet (600 mg total) by mouth every 6 (six) hours as needed for mild pain       Objective     /70 (BP Location: Right arm, Patient Position: Sitting, Cuff Size: Large)   Pulse 96   Temp (!) 97.3 °F (36.3 °C) (Temporal)   Resp 16   Ht 5' 4" (1.626 m)   Wt 116 kg (255 lb)   SpO2 98%   Breastfeeding No   BMI 43.77 kg/m²     Physical Exam  Vitals and nursing note reviewed. Constitutional:       General: She is awake. She is not in acute distress. Appearance: Normal appearance. She is well-developed and well-groomed. She is not ill-appearing, toxic-appearing or diaphoretic. HENT:      Head: Normocephalic and atraumatic. Nose: Nose normal.      Mouth/Throat:      Mouth: Mucous membranes are moist.      Pharynx: Oropharynx is clear. No oropharyngeal exudate or posterior oropharyngeal erythema. Eyes:      Extraocular Movements: Extraocular movements intact. Conjunctiva/sclera: Conjunctivae normal.      Pupils: Pupils are equal, round, and reactive to light. Cardiovascular:      Rate and Rhythm: Normal rate and regular rhythm. No extrasystoles are present. Pulses: Normal pulses. Radial pulses are 2+ on the right side and 2+ on the left side. Heart sounds: Normal heart sounds, S1 normal and S2 normal.   Pulmonary:      Effort: Pulmonary effort is normal.      Breath sounds: Normal breath sounds and air entry. Abdominal:      General: Abdomen is protuberant. Bowel sounds are normal.      Palpations: Abdomen is soft. There is no mass. Tenderness: There is no abdominal tenderness. There is no right CVA tenderness, left CVA tenderness or guarding. Musculoskeletal:         General: Normal range of motion. Cervical back: Normal range of motion. Right lower leg: No edema. Left lower leg: No edema. Skin:     General: Skin is warm.       Capillary Refill: Capillary refill takes less than 2 seconds. Coloration: Skin is not jaundiced or pale. Findings: No bruising, erythema, lesion or rash. Neurological:      General: No focal deficit present. Mental Status: She is alert. Psychiatric:         Behavior: Behavior is cooperative.        Kranthi Bryant MD

## 2023-09-18 NOTE — ASSESSMENT & PLAN NOTE
Assessment:   BMI 43.77, previous BMI 42.74, morbid obese  Goal: BMI between 18.5 to 25. Previously referred to weight management. Recommended life style changes at her previous visit. Reports poor compliance with previous recommendations. Plan  Will refer the patient to weight management once again per patient request   Will start metformin 1000 mg bid and reassess at next visit   Encourage the patient to loose at least 5 pounds before next visit  Counseled patient on the importance of working to achieve weight reduction goal.   Discussed benefits of weight loss including prevention or control of comorbidities. Discussed role that balanced diet and daily activity play in weight reduction. Set up small attainable weight loss goals. Involve family, friends, and co-workers for support. Exercise should be 30 minutes 5 times weekly of moderate intensity activity, brisk walking acceptable. Recommended diet include mediterranean and DASH. Primary focus should be unprocessed foods, fresh fruits & vegetables, plant based fats and protein, legumes, whole grain and nuts. Limit red meats, fast food and eating out at restaurants.

## 2023-09-19 DIAGNOSIS — E66.01 CLASS 3 SEVERE OBESITY DUE TO EXCESS CALORIES WITHOUT SERIOUS COMORBIDITY WITH BODY MASS INDEX (BMI) OF 40.0 TO 44.9 IN ADULT (HCC): ICD-10-CM

## 2023-12-04 ENCOUNTER — OFFICE VISIT (OUTPATIENT)
Dept: FAMILY MEDICINE CLINIC | Facility: CLINIC | Age: 28
End: 2023-12-04

## 2023-12-04 VITALS
SYSTOLIC BLOOD PRESSURE: 130 MMHG | DIASTOLIC BLOOD PRESSURE: 78 MMHG | RESPIRATION RATE: 16 BRPM | WEIGHT: 248 LBS | HEIGHT: 64 IN | BODY MASS INDEX: 42.34 KG/M2 | OXYGEN SATURATION: 98 % | HEART RATE: 92 BPM | TEMPERATURE: 97.1 F

## 2023-12-04 DIAGNOSIS — E66.01 CLASS 3 SEVERE OBESITY DUE TO EXCESS CALORIES WITHOUT SERIOUS COMORBIDITY WITH BODY MASS INDEX (BMI) OF 40.0 TO 44.9 IN ADULT (HCC): Primary | ICD-10-CM

## 2023-12-04 PROCEDURE — 99213 OFFICE O/P EST LOW 20 MIN: CPT | Performed by: FAMILY MEDICINE

## 2023-12-04 RX ORDER — BLOOD SUGAR DIAGNOSTIC
STRIP MISCELLANEOUS WEEKLY
Qty: 100 EACH | Refills: 0 | Status: SHIPPED | OUTPATIENT
Start: 2023-12-04

## 2023-12-04 NOTE — ASSESSMENT & PLAN NOTE
Assessment:   BMI 42.57 improved from previous reading @ 43.77; 7 lbs lost in 3 months, still morbid obese  Goal: BMI between 18.5 to 25.   Started on metformin 1000 mg bid during last visit, compliant, currently denies side effect   Recommended life style changes at her previous visit.   Reports poor compliance with previous recommendations.   Reports no possibility of being/getting pregnant since her  is sterile.   Last pregnancy was thru IUI.     Plan  Continue metformin 1000 mg bid,   Start Wegovy 0.25 weekly, reassess in 1 months with pcp.   Educated of the risk by using Wegovy including Medullary Thyroid CA and becoming pregnant, recommended to use condom as barrier, patient agreeable.   Counseled patient on the importance of working to achieve weight reduction goal.   Discussed benefits of weight loss including prevention or control of comorbidities. Discussed role that balanced diet and daily activity play in weight reduction.   Set up small attainable weight loss goals. Involve family, friends, and co-workers for support.   Exercise should be 30 minutes 5 times weekly of moderate intensity activity, brisk walking acceptable. Recommended diet include mediterranean and DASH.   Primary focus should be unprocessed foods, fresh fruits & vegetables, plant based fats and protein, legumes, whole grain and nuts.   Limit red meats, fast food and eating out at restaurants.

## 2023-12-04 NOTE — PROGRESS NOTES
Name: Diana Johnson      : 1995      MRN: [id number]  Encounter Provider: Uday Scott MD  Encounter Date: 2023   Encounter department: [facility]    Assessment & Plan     1. Class 3 severe obesity due to excess calories without serious comorbidity with body mass index (BMI) of 40.0 to 44.9 in adult ([facility])  Assessment & Plan:  Assessment:   BMI 42.57 improved from previous reading @ 43.77; 7 lbs lost in 3 months, still morbid obese  Goal: BMI between 18.5 to 25.   Started on metformin 1000 mg bid during last visit, compliant, currently denies side effect   Recommended life style changes at her previous visit.   Reports poor compliance with previous recommendations.   Reports no possibility of being/getting pregnant since her  is sterile.   Last pregnancy was thru IUI.     Plan  Continue metformin 1000 mg bid,   Start Wegovy 0.25 weekly, reassess in 1 months with pcp.   Educated of the risk by using Wegovy including Medullary Thyroid CA and becoming pregnant, recommended to use condom as barrier, patient agreeable.   Counseled patient on the importance of working to achieve weight reduction goal.   Discussed benefits of weight loss including prevention or control of comorbidities. Discussed role that balanced diet and daily activity play in weight reduction.   Set up small attainable weight loss goals. Involve family, friends, and co-workers for support.   Exercise should be 30 minutes 5 times weekly of moderate intensity activity, brisk walking acceptable. Recommended diet include mediterranean and DASH.   Primary focus should be unprocessed foods, fresh fruits & vegetables, plant based fats and protein, legumes, whole grain and nuts.   Limit red meats, fast food and eating out at restaurants.    Orders:  -     Semaglutide-Weight Management (WEGOVY) 0.25 MG/0.5ML; Inject 0.5 mL (0.25 mg total) under the skin once a week  -     Alcohol  Swabs (Alcohol Pads) 70 % PADS; Use once a week           Subjective      It was a pleasure to Diana LOW Moreno, 27 y.o. female , who presents to follow up on her obesity.   During last visit patient was started on metformin 1000 mg bid, about 3 months ago.   Reports only mild weight loss.   Reports GI side effects at the beginning of her treatment, that have resolved since.   Interested on Semaglutide treatment for weight loss.   Denies FMHx or PMHx of thyroid cancer.     Reports no complaints today. Denies unintentional weight loss, fever, chills, fatigue, weakness, headaches, dizziness, rashes, blurred vision, nausea, palpitation, chest pain, SOB, abdominal pain, urinary changes, bowel changes, legs swelling/pain, sleep problems,  sick contacts or recent travel    Patient's medical conditions are stable unless noted otherwise above.  Patient has not had any recent hospitalizations, or medical emergencies since last visit. Does not take any medications   Overall patient reports feeling well.  Patient has no further complaints other than what is mentioned in the ROS. Denies tobacco, alcohol and illicit drug consumption, she is currently unemployed, lives with  and son.       Review of Systems   Constitutional:  Negative for activity change, appetite change, chills, fatigue and fever.   HENT:  Negative for congestion, postnasal drip, rhinorrhea, sneezing and sore throat.    Eyes:  Negative for visual disturbance.   Respiratory:  Negative for cough, chest tightness, shortness of breath and wheezing.    Cardiovascular:  Negative for chest pain, palpitations and leg swelling.   Gastrointestinal:  Negative for abdominal distention, abdominal pain, blood in stool, constipation, diarrhea, nausea and vomiting.   Genitourinary:  Negative for difficulty urinating, dysuria, hematuria, menstrual problem, vaginal bleeding and vaginal discharge.   Musculoskeletal:  Negative for arthralgias and myalgias.   Skin:   "Negative for rash.   Neurological:  Negative for dizziness, syncope, weakness, light-headedness, numbness and headaches.   Psychiatric/Behavioral:  Negative for agitation, behavioral problems and suicidal ideas.        Current Outpatient Medications on File Prior to Visit   Medication Sig    ibuprofen (MOTRIN) 600 mg tablet Take 1 tablet (600 mg total) by mouth every 6 (six) hours as needed for mild pain    metFORMIN (GLUCOPHAGE) 1000 MG tablet Take 1 tablet (1,000 mg total) by mouth 2 (two) times a day with meals       Objective     /78 (BP Location: Left arm, Patient Position: Sitting, Cuff Size: Large)   Pulse 92   Temp (!) 97.1 °F (36.2 °C) (Temporal)   Resp 16   Ht 5' 4\" (1.626 m)   Wt 112 kg (248 lb)   SpO2 98%   BMI 42.57 kg/m²     Physical Exam  Vitals and nursing note reviewed.   Constitutional:       General: She is awake. She is not in acute distress.     Appearance: Normal appearance. She is well-developed and well-groomed. She is morbidly obese. She is not ill-appearing, toxic-appearing or diaphoretic.   HENT:      Head: Normocephalic and atraumatic.      Nose: Nose normal.      Mouth/Throat:      Mouth: Mucous membranes are moist.      Pharynx: Oropharynx is clear. No oropharyngeal exudate or posterior oropharyngeal erythema.   Eyes:      Extraocular Movements: Extraocular movements intact.      Conjunctiva/sclera: Conjunctivae normal.      Pupils: Pupils are equal, round, and reactive to light.   Cardiovascular:      Rate and Rhythm: Normal rate and regular rhythm. No extrasystoles are present.     Pulses: Normal pulses.           Radial pulses are 2+ on the right side and 2+ on the left side.      Heart sounds: Normal heart sounds, S1 normal and S2 normal.   Pulmonary:      Effort: Pulmonary effort is normal.      Breath sounds: Normal breath sounds and air entry.   Abdominal:      General: Bowel sounds are normal.      Palpations: Abdomen is soft. There is no mass.      Tenderness: " There is no abdominal tenderness. There is no right CVA tenderness, left CVA tenderness or guarding.   Musculoskeletal:         General: Normal range of motion.      Cervical back: Normal range of motion.      Right lower leg: No edema.      Left lower leg: No edema.   Skin:     General: Skin is warm.      Capillary Refill: Capillary refill takes less than 2 seconds.      Coloration: Skin is not jaundiced or pale.      Findings: No bruising, erythema, lesion or rash.   Neurological:      General: No focal deficit present.      Mental Status: She is alert.   Psychiatric:         Behavior: Behavior is cooperative.       Uday Scott MD

## 2023-12-08 ENCOUNTER — TELEPHONE (OUTPATIENT)
Dept: FAMILY MEDICINE CLINIC | Facility: CLINIC | Age: 28
End: 2023-12-08

## 2023-12-08 NOTE — TELEPHONE ENCOUNTER
PCP SIGNATURE NEEDED FOR Cover My meds FORM RECEIVED VIA FAX AND PLACED IN PCP FOLDER TO BE DELIVERED AT ASSIGNED TIMES.       Wegovy 0.25mg prior auth

## 2024-01-07 NOTE — PROGRESS NOTES
ANNUAL GYNECOLOGICAL EXAMINATION    Diana Johnson is a 28 y.o. female who presents today for annual GYN exam.  Her last pap smear was performed *** and result was ***.  She reports no*** history of abnormal pap smears in her past. Her contraceptive method is ***. Her general medical history has been reviewed and she reports it as follows:    No past medical history on file.  No past surgical history on file.  OB History          1    Para        Term                AB        Living             SAB        IAB        Ectopic        Multiple        Live Births                   Social History     Tobacco Use    Smoking status: Never    Smokeless tobacco: Never   Vaping Use    Vaping status: Never Used   Substance Use Topics    Alcohol use: Never    Drug use: Not Currently     Cancer-related family history is not on file.    Current Outpatient Medications:     Alcohol Swabs (Alcohol Pads) 70 % PADS, Use once a week, Disp: 100 each, Rfl: 0    ibuprofen (MOTRIN) 600 mg tablet, Take 1 tablet (600 mg total) by mouth every 6 (six) hours as needed for mild pain, Disp: 30 tablet, Rfl: 0    metFORMIN (GLUCOPHAGE) 1000 MG tablet, Take 1 tablet (1,000 mg total) by mouth 2 (two) times a day with meals, Disp: 60 tablet, Rfl: 3    Semaglutide-Weight Management (WEGOVY) 0.25 MG/0.5ML, Inject 0.5 mL (0.25 mg total) under the skin once a week, Disp: 2 mL, Rfl: 2    Review of Systems:  Review of Systems   Constitutional:  Negative for chills and fever.   HENT:  Negative for ear pain and sore throat.    Eyes:  Negative for pain and visual disturbance.   Respiratory:  Negative for cough and shortness of breath.    Cardiovascular:  Negative for chest pain and palpitations.   Gastrointestinal:  Negative for abdominal pain and vomiting.   Genitourinary:  Negative for dysuria and hematuria.   Musculoskeletal:  Negative for arthralgias and back pain.   Skin:  Negative for color change and rash.   Neurological:   Negative for seizures and syncope.   All other systems reviewed and are negative.      Physical Exam:  Physical Exam  Exam conducted with a chaperone present.   Constitutional:       General: She is not in acute distress.     Appearance: Normal appearance. She is obese. She is not toxic-appearing.   HENT:      Head: Normocephalic and atraumatic.      Nose: Nose normal. No congestion or rhinorrhea.   Eyes:      General: No scleral icterus.     Conjunctiva/sclera: Conjunctivae normal.   Cardiovascular:      Rate and Rhythm: Normal rate and regular rhythm.   Abdominal:      General: Abdomen is flat. There is no distension.      Palpations: Abdomen is soft.      Tenderness: There is no abdominal tenderness. There is no guarding.   Genitourinary:     Exam position: Lithotomy position.      Pubic Area: No rash or pubic lice.       Ziggy stage (genital): 5.      Labia:         Right: No rash or lesion.         Left: No rash or lesion.       Urethra: No prolapse or urethral swelling.      Vagina: Normal. No signs of injury. No vaginal discharge or tenderness.      Cervix: Normal. No discharge, erythema or cervical bleeding.      Uterus: Normal.       Adnexa: Right adnexa normal and left adnexa normal.        Right: No mass.          Left: No mass.     Musculoskeletal:      Cervical back: Normal range of motion and neck supple.      Right lower leg: No edema.      Left lower leg: No edema.   Skin:     General: Skin is warm and dry.      Capillary Refill: Capillary refill takes less than 2 seconds.      Coloration: Skin is not jaundiced or pale.   Neurological:      General: No focal deficit present.      Mental Status: She is alert and oriented to person, place, and time.   Psychiatric:         Mood and Affect: Mood normal.         Behavior: Behavior normal.         Point of Care Testing:   -Wet mount: ***   -KOH mount: ***   -Whiff: ***   -urine pregnancy test: ***   -urinalysis: ***    Assessment:   1. Normal well-woman  GYN exam.***    Plan:   1. Pap smear done with HPV co-testing***reflex***.   ***. Cultures ordered: ***   ***. Imaging ordered: ***   ***. Bloodwork ordered: ***   ***. Return to office ***.

## 2024-01-08 ENCOUNTER — ANNUAL EXAM (OUTPATIENT)
Dept: FAMILY MEDICINE CLINIC | Facility: CLINIC | Age: 29
End: 2024-01-08

## 2024-01-08 VITALS
OXYGEN SATURATION: 98 % | RESPIRATION RATE: 16 BRPM | BODY MASS INDEX: 42.85 KG/M2 | HEIGHT: 64 IN | SYSTOLIC BLOOD PRESSURE: 120 MMHG | WEIGHT: 251 LBS | TEMPERATURE: 98 F | DIASTOLIC BLOOD PRESSURE: 76 MMHG | HEART RATE: 94 BPM

## 2024-01-08 DIAGNOSIS — E66.01 CLASS 3 SEVERE OBESITY DUE TO EXCESS CALORIES WITHOUT SERIOUS COMORBIDITY WITH BODY MASS INDEX (BMI) OF 40.0 TO 44.9 IN ADULT (HCC): ICD-10-CM

## 2024-01-08 PROCEDURE — 99395 PREV VISIT EST AGE 18-39: CPT | Performed by: FAMILY MEDICINE

## 2024-01-08 NOTE — PROGRESS NOTES
ADULT ANNUAL PHYSICAL  [facility]    NAME: Diana Johnson  AGE: 28 y.o. SEX: female  : 1995     DATE: 2024     Assessment and Plan:     Problem List Items Addressed This Visit       Class 3 severe obesity due to excess calories without serious comorbidity with body mass index (BMI) of 40.0 to 44.9 in adult (HCC)    Relevant Medications    metFORMIN (GLUCOPHAGE) 1000 MG tablet       Immunizations and preventive care screenings were discussed with patient today. Appropriate education was printed on patient's after visit summary.    Counseling:  Alcohol/drug use: discussed moderation in alcohol intake, the recommendations for healthy alcohol use, and avoidance of illicit drug use.  Sexual health: discussed sexually transmitted diseases, partner selection, use of condoms, avoidance of unintended pregnancy, and contraceptive alternatives.  Exercise: the importance of regular exercise/physical activity was discussed. Recommend exercise 3-5 times per week for at least 30 minutes.     BMI Counseling: Body mass index is 43.08 kg/m². The BMI is above normal. Nutrition recommendations include decreasing portion sizes, consuming healthier snacks and limiting drinks that contain sugar. Exercise recommendations include exercising 3-5 times per week. Rationale for BMI follow-up plan is due to patient being overweight or obese.     Depression Screening and Follow-up Plan: Patient was screened for depression during today's encounter. They screened negative with a PHQ-2 score of 0.        Return in about 1 week (around 1/15/2024) for pap smear with Dr. Bradley .     Chief Complaint:     Chief Complaint   Patient presents with    Follow-up     Pap smear       History of Present Illness:     Adult Annual Physical   Patient here for a comprehensive physical exam. The patient reports no problems.    Diet and Physical  Activity  Diet/Nutrition:  a lot of rice, but eat veggies and fruits. Denies eating a lot of fat foods .   Exercise: no formal exercise. Reports that she does not have a lot of time to exercise, but tries to play with her son and walks every week    Depression Screening  PHQ-2/9 Depression Screening    Little interest or pleasure in doing things: 0 - not at all  Feeling down, depressed, or hopeless: 0 - not at all  Trouble falling or staying asleep, or sleeping too much: 0 - not at all  Feeling tired or having little energy: 1 - several days  Poor appetite or overeatin - not at all  Feeling bad about yourself - or that you are a failure or have let yourself or your family down: 0 - not at all  Trouble concentrating on things, such as reading the newspaper or watching television: 0 - not at all  Moving or speaking so slowly that other people could have noticed. Or the opposite - being so fidgety or restless that you have been moving around a lot more than usual: 0 - not at all  Thoughts that you would be better off dead, or of hurting yourself in some way: 0 - not at all  PHQ-2 Score: 0  PHQ-2 Interpretation: Negative depression screen  PHQ-9 Score: 1  PHQ-9 Interpretation: No or Minimal depression       General Health  Sleep: gets 7-8 hours of sleep on average.   Hearing: normal - bilateral.  Vision: most recent eye exam <1 year ago and wears glasses.   Dental: regular dental visits, brushes teeth twice daily, and flosses teeth occasionally.       /GYN Health  Follows with gynecology? no   Last menstrual period: 2024  Contraceptive method:  Wishing to get pregnant, has hx of infertility, had to do IVF 3 years ago  .  History of STDs?: no.     Advanced Care Planning  Do you have an advanced directive? no  Do you have a durable medical power of ? no     Review of Systems:     Review of Systems   Constitutional:  Negative for chills and fever.   HENT:  Negative for congestion and sore throat.    Eyes:   Negative for pain and visual disturbance.   Respiratory:  Negative for cough and shortness of breath.    Cardiovascular:  Negative for chest pain and palpitations.   Gastrointestinal:  Negative for abdominal pain and vomiting.   Genitourinary:  Negative for dysuria and hematuria.   Musculoskeletal:  Negative for arthralgias and back pain.   Skin:  Negative for color change and rash.   Neurological:  Negative for seizures and syncope.   All other systems reviewed and are negative.     Past Medical History:     No past medical history on file.   Past Surgical History:     No past surgical history on file.   Social History:     Social History     Socioeconomic History    Marital status: /Civil Union     Spouse name: None    Number of children: None    Years of education: None    Highest education level: None   Occupational History    None   Tobacco Use    Smoking status: Never    Smokeless tobacco: Never   Vaping Use    Vaping status: Never Used   Substance and Sexual Activity    Alcohol use: Never    Drug use: Not Currently    Sexual activity: None   Other Topics Concern    None   Social History Narrative    None     Social Determinants of Health     Financial Resource Strain: Low Risk  (12/4/2023)    Overall Financial Resource Strain (CARDIA)     Difficulty of Paying Living Expenses: Not hard at all   Food Insecurity: No Food Insecurity (12/4/2023)    Hunger Vital Sign     Worried About Running Out of Food in the Last Year: Never true     Ran Out of Food in the Last Year: Never true   Transportation Needs: No Transportation Needs (12/4/2023)    PRAPARE - Transportation     Lack of Transportation (Medical): No     Lack of Transportation (Non-Medical): No   Physical Activity: Not on file   Stress: Not on file   Social Connections: Not on file   Intimate Partner Violence: Not on file   Housing Stability: Not on file      Family History:     Family History   Problem Relation Age of Onset    Hypertension Father   "   Diabetes Maternal Grandmother     Diabetes Maternal Grandfather       Current Medications:     Current Outpatient Medications   Medication Sig Dispense Refill    metFORMIN (GLUCOPHAGE) 1000 MG tablet Take 1 tablet (1,000 mg total) by mouth 2 (two) times a day with meals 60 tablet 3    Alcohol Swabs (Alcohol Pads) 70 % PADS Use once a week 100 each 0    ibuprofen (MOTRIN) 600 mg tablet Take 1 tablet (600 mg total) by mouth every 6 (six) hours as needed for mild pain 30 tablet 0    Semaglutide-Weight Management (WEGOVY) 0.25 MG/0.5ML Inject 0.5 mL (0.25 mg total) under the skin once a week 2 mL 2     No current facility-administered medications for this visit.      Allergies:     Allergies   Allergen Reactions    Pineapple - Food Allergy Swelling      Physical Exam:     /76 (BP Location: Left arm, Patient Position: Sitting, Cuff Size: Large)   Pulse 94   Temp 98 °F (36.7 °C) (Temporal)   Resp 16   Ht 5' 4\" (1.626 m)   Wt 114 kg (251 lb)   SpO2 98%   BMI 43.08 kg/m²     Physical Exam  Vitals and nursing note reviewed.   Constitutional:       General: She is not in acute distress.     Appearance: She is well-developed. She is obese.   HENT:      Head: Normocephalic and atraumatic.      Right Ear: Tympanic membrane and external ear normal.      Left Ear: Tympanic membrane and external ear normal.      Nose: No congestion or rhinorrhea.      Mouth/Throat:      Mouth: Mucous membranes are moist.      Pharynx: Oropharynx is clear. No oropharyngeal exudate or posterior oropharyngeal erythema.   Eyes:      General: No scleral icterus.     Conjunctiva/sclera: Conjunctivae normal.   Cardiovascular:      Rate and Rhythm: Normal rate and regular rhythm.      Heart sounds: No murmur heard.  Pulmonary:      Effort: Pulmonary effort is normal. No respiratory distress.      Breath sounds: Normal breath sounds.   Abdominal:      Palpations: Abdomen is soft.      Tenderness: There is no abdominal tenderness. "   Musculoskeletal:         General: No swelling.      Cervical back: Normal range of motion and neck supple.      Right lower leg: No edema.      Left lower leg: No edema.   Skin:     General: Skin is warm and dry.      Capillary Refill: Capillary refill takes less than 2 seconds.   Neurological:      General: No focal deficit present.      Mental Status: She is alert and oriented to person, place, and time.   Psychiatric:         Mood and Affect: Mood normal.         Behavior: Behavior normal.          Sydni Bradley MD   [facility]

## 2024-01-24 ENCOUNTER — RA CDI HCC (OUTPATIENT)
Dept: OTHER | Facility: HOSPITAL | Age: 29
End: 2024-01-24

## 2024-01-25 NOTE — PROGRESS NOTES
ANNUAL GYNECOLOGICAL EXAMINATION    Diana Johnson is a 28 y.o. female who presents today for annual GYN exam.  Her last pap smear was performed 3 years and result was normal as per patient.  She reports no history of abnormal pap smears in her past. Her contraceptive method is none.     LMP: unsure, Beginning of January.     Patient denies any vaginal discharge, pelvic pain, fever or risky behavior for STDs    No past medical history on file.  No past surgical history on file.  OB History          1    Para        Term                AB        Living             SAB        IAB        Ectopic        Multiple        Live Births                   Social History     Tobacco Use    Smoking status: Never     Passive exposure: Never    Smokeless tobacco: Never   Vaping Use    Vaping status: Never Used   Substance Use Topics    Alcohol use: Never    Drug use: Not Currently     Cancer-related family history is not on file.    Current Outpatient Medications:     Alcohol Swabs (Alcohol Pads) 70 % PADS, Use once a week, Disp: 100 each, Rfl: 0    ibuprofen (MOTRIN) 600 mg tablet, Take 1 tablet (600 mg total) by mouth every 6 (six) hours as needed for mild pain, Disp: 30 tablet, Rfl: 0    metFORMIN (GLUCOPHAGE) 1000 MG tablet, Take 1 tablet (1,000 mg total) by mouth 2 (two) times a day with meals, Disp: 60 tablet, Rfl: 3    Semaglutide-Weight Management (WEGOVY) 0.25 MG/0.5ML, Inject 0.5 mL (0.25 mg total) under the skin once a week, Disp: 2 mL, Rfl: 2    Review of Systems:  Review of Systems   Constitutional:  Negative for fever.   Gastrointestinal:  Negative for abdominal pain and vomiting.   Genitourinary:  Negative for dysuria and hematuria.   Skin:  Negative for rash.   All other systems reviewed and are negative.      Physical Exam:  Physical Exam  Exam conducted with a chaperone present.   Constitutional:       General: She is not in acute distress.     Appearance: Normal appearance. She  is obese. She is not toxic-appearing.   HENT:      Head: Normocephalic and atraumatic.      Nose: Nose normal. No congestion or rhinorrhea.   Eyes:      General: No scleral icterus.     Conjunctiva/sclera: Conjunctivae normal.   Cardiovascular:      Rate and Rhythm: Normal rate and regular rhythm.      Heart sounds: Normal heart sounds. No murmur heard.     No gallop.   Pulmonary:      Effort: Pulmonary effort is normal. No respiratory distress.      Breath sounds: No wheezing or rhonchi.   Genitourinary:     General: Normal vulva.      Exam position: Lithotomy position.      Pubic Area: No rash or pubic lice.       Ziggy stage (genital): 5.      Vagina: Normal. No signs of injury and foreign body. No vaginal discharge, erythema, bleeding or prolapsed vaginal walls.      Cervix: Normal. No friability, lesion or erythema.      Uterus: Normal. Not fixed.       Adnexa: Right adnexa normal and left adnexa normal.        Right: No mass or tenderness.          Left: No mass or tenderness.     Musculoskeletal:      Cervical back: Normal range of motion and neck supple.      Right lower leg: No edema.      Left lower leg: No edema.   Skin:     General: Skin is warm and dry.      Capillary Refill: Capillary refill takes less than 2 seconds.      Coloration: Skin is not jaundiced or pale.   Neurological:      General: No focal deficit present.      Mental Status: She is alert and oriented to person, place, and time.   Psychiatric:         Mood and Affect: Mood normal.         Behavior: Behavior normal.           Assessment:   1. Normal well-woman GYN exam.    Plan:   1. Pap smear done with HPV co-testing

## 2024-01-31 ENCOUNTER — ANNUAL EXAM (OUTPATIENT)
Dept: FAMILY MEDICINE CLINIC | Facility: CLINIC | Age: 29
End: 2024-01-31

## 2024-01-31 VITALS
WEIGHT: 252 LBS | SYSTOLIC BLOOD PRESSURE: 116 MMHG | HEART RATE: 86 BPM | TEMPERATURE: 97.8 F | BODY MASS INDEX: 43.02 KG/M2 | OXYGEN SATURATION: 97 % | DIASTOLIC BLOOD PRESSURE: 74 MMHG | HEIGHT: 64 IN | RESPIRATION RATE: 18 BRPM

## 2024-01-31 DIAGNOSIS — Z12.4 CERVICAL CANCER SCREENING: ICD-10-CM

## 2024-01-31 DIAGNOSIS — Z01.419 ENCOUNTER FOR ANNUAL ROUTINE GYNECOLOGICAL EXAMINATION: Primary | ICD-10-CM

## 2024-01-31 PROCEDURE — G0476 HPV COMBO ASSAY CA SCREEN: HCPCS

## 2024-01-31 PROCEDURE — G0145 SCR C/V CYTO,THINLAYER,RESCR: HCPCS

## 2024-01-31 PROCEDURE — S0612 ANNUAL GYNECOLOGICAL EXAMINA: HCPCS | Performed by: FAMILY MEDICINE

## 2024-02-01 LAB
HPV HR 12 DNA CVX QL NAA+PROBE: NEGATIVE
HPV16 DNA CVX QL NAA+PROBE: NEGATIVE
HPV18 DNA CVX QL NAA+PROBE: NEGATIVE

## 2024-02-05 LAB
LAB AP GYN PRIMARY INTERPRETATION: NORMAL
Lab: NORMAL

## 2024-03-06 ENCOUNTER — APPOINTMENT (OUTPATIENT)
Dept: LAB | Facility: CLINIC | Age: 29
End: 2024-03-06
Payer: COMMERCIAL

## 2024-03-06 ENCOUNTER — OFFICE VISIT (OUTPATIENT)
Dept: FAMILY MEDICINE CLINIC | Facility: CLINIC | Age: 29
End: 2024-03-06

## 2024-03-06 VITALS
HEART RATE: 96 BPM | HEIGHT: 64 IN | DIASTOLIC BLOOD PRESSURE: 78 MMHG | OXYGEN SATURATION: 98 % | TEMPERATURE: 97.5 F | RESPIRATION RATE: 18 BRPM | BODY MASS INDEX: 40.8 KG/M2 | WEIGHT: 239 LBS | SYSTOLIC BLOOD PRESSURE: 126 MMHG

## 2024-03-06 DIAGNOSIS — N93.9 ABNORMAL UTERINE BLEEDING: ICD-10-CM

## 2024-03-06 DIAGNOSIS — N93.9 ABNORMAL UTERINE BLEEDING: Primary | ICD-10-CM

## 2024-03-06 LAB
ALBUMIN SERPL BCP-MCNC: 4.2 G/DL (ref 3.5–5)
ALP SERPL-CCNC: 97 U/L (ref 34–104)
ALT SERPL W P-5'-P-CCNC: 15 U/L (ref 7–52)
ANION GAP SERPL CALCULATED.3IONS-SCNC: 8 MMOL/L
APTT PPP: 29 SECONDS (ref 23–37)
AST SERPL W P-5'-P-CCNC: 21 U/L (ref 13–39)
B-HCG SERPL-ACNC: 1 MIU/ML (ref 0–5)
BASOPHILS # BLD AUTO: 0.04 THOUSANDS/ÂΜL (ref 0–0.1)
BASOPHILS NFR BLD AUTO: 1 % (ref 0–1)
BILIRUB SERPL-MCNC: 0.29 MG/DL (ref 0.2–1)
BUN SERPL-MCNC: 12 MG/DL (ref 5–25)
CALCIUM SERPL-MCNC: 9 MG/DL (ref 8.4–10.2)
CHLORIDE SERPL-SCNC: 102 MMOL/L (ref 96–108)
CO2 SERPL-SCNC: 27 MMOL/L (ref 21–32)
CREAT SERPL-MCNC: 0.67 MG/DL (ref 0.6–1.3)
EOSINOPHIL # BLD AUTO: 0.05 THOUSAND/ÂΜL (ref 0–0.61)
EOSINOPHIL NFR BLD AUTO: 1 % (ref 0–6)
ERYTHROCYTE [DISTWIDTH] IN BLOOD BY AUTOMATED COUNT: 14.4 % (ref 11.6–15.1)
FERRITIN SERPL-MCNC: 30 NG/ML (ref 11–307)
GFR SERPL CREATININE-BSD FRML MDRD: 120 ML/MIN/1.73SQ M
GLUCOSE P FAST SERPL-MCNC: 83 MG/DL (ref 65–99)
HCT VFR BLD AUTO: 42.8 % (ref 34.8–46.1)
HGB BLD-MCNC: 13.5 G/DL (ref 11.5–15.4)
IMM GRANULOCYTES # BLD AUTO: 0.06 THOUSAND/UL (ref 0–0.2)
IMM GRANULOCYTES NFR BLD AUTO: 1 % (ref 0–2)
INR PPP: 1.06 (ref 0.84–1.19)
IRON SATN MFR SERPL: 15 % (ref 15–50)
IRON SERPL-MCNC: 53 UG/DL (ref 50–212)
LYMPHOCYTES # BLD AUTO: 2.63 THOUSANDS/ÂΜL (ref 0.6–4.47)
LYMPHOCYTES NFR BLD AUTO: 35 % (ref 14–44)
MCH RBC QN AUTO: 27.2 PG (ref 26.8–34.3)
MCHC RBC AUTO-ENTMCNC: 31.5 G/DL (ref 31.4–37.4)
MCV RBC AUTO: 86 FL (ref 82–98)
MONOCYTES # BLD AUTO: 0.46 THOUSAND/ÂΜL (ref 0.17–1.22)
MONOCYTES NFR BLD AUTO: 6 % (ref 4–12)
NEUTROPHILS # BLD AUTO: 4.39 THOUSANDS/ÂΜL (ref 1.85–7.62)
NEUTS SEG NFR BLD AUTO: 56 % (ref 43–75)
NRBC BLD AUTO-RTO: 0 /100 WBCS
PLATELET # BLD AUTO: 301 THOUSANDS/UL (ref 149–390)
PMV BLD AUTO: 11.6 FL (ref 8.9–12.7)
POTASSIUM SERPL-SCNC: 4.6 MMOL/L (ref 3.5–5.3)
PROT SERPL-MCNC: 7.8 G/DL (ref 6.4–8.4)
PROTHROMBIN TIME: 13.7 SECONDS (ref 11.6–14.5)
RBC # BLD AUTO: 4.97 MILLION/UL (ref 3.81–5.12)
SODIUM SERPL-SCNC: 137 MMOL/L (ref 135–147)
TIBC SERPL-MCNC: 351 UG/DL (ref 250–450)
UIBC SERPL-MCNC: 298 UG/DL (ref 155–355)
WBC # BLD AUTO: 7.63 THOUSAND/UL (ref 4.31–10.16)

## 2024-03-06 PROCEDURE — 80053 COMPREHEN METABOLIC PANEL: CPT

## 2024-03-06 PROCEDURE — 82728 ASSAY OF FERRITIN: CPT

## 2024-03-06 PROCEDURE — 99213 OFFICE O/P EST LOW 20 MIN: CPT | Performed by: FAMILY MEDICINE

## 2024-03-06 PROCEDURE — 85730 THROMBOPLASTIN TIME PARTIAL: CPT

## 2024-03-06 PROCEDURE — 83540 ASSAY OF IRON: CPT

## 2024-03-06 PROCEDURE — 36415 COLL VENOUS BLD VENIPUNCTURE: CPT

## 2024-03-06 PROCEDURE — 84702 CHORIONIC GONADOTROPIN TEST: CPT

## 2024-03-06 PROCEDURE — 83550 IRON BINDING TEST: CPT

## 2024-03-06 PROCEDURE — 85610 PROTHROMBIN TIME: CPT

## 2024-03-06 PROCEDURE — 85025 COMPLETE CBC W/AUTO DIFF WBC: CPT

## 2024-03-06 RX ORDER — NORETHINDRONE ACETATE AND ETHINYL ESTRADIOL 1MG-20(21)
1 KIT ORAL DAILY
Qty: 84 TABLET | Refills: 3 | Status: SHIPPED | OUTPATIENT
Start: 2024-03-06

## 2024-03-06 NOTE — PROGRESS NOTES
Name: Diana Johnson      : 1995      MRN: [id number]  Encounter Provider: Lelia Singh MD  Encounter Date: 3/6/2024   Encounter department: [facility]    Assessment & Plan     1. Abnormal uterine bleeding  Assessment & Plan:  Obtain lab work to rule out anemia  Referred for pelvic ultrasound  Start estrogen progestin pills daily      Orders:  -     CBC and differential; Future  -     Iron Panel (Includes Ferritin, Iron Sat%, Iron, and TIBC); Future  -     Comprehensive metabolic panel; Future  -     hCG, quantitative; Future  -     US pelvis complete non OB; Future; Expected date: 2024  -     Protime (PT) and Partial Thromboplastin Time (PTT); Future  -     norethindrone-ethinyl estradiol (Loestrin Fe ) 1-20 MG-MCG per tablet; Take 1 tablet by mouth daily         Subjective      HPI  Diana Johnson is a 28 y.o. female  who presented to the office today with a h/o increased duration of heavy menstrual bleeding.  She started her menses 2 weeks ago and it has continued with heavy clots.  She has some pelvic cramping/pain.  She has a h/o of menorrhagia since her menarche at age 12 yrs old, but notes that the concerning heavy menstruation and increased frequency of menstrual periods started about 4 months ago.  At times she does have a mild headache and lower back pain  Denies associated nausea, vomiting, dizziness , chest pain or shortness of breath.    , her son is 3 years old, .    The following portions of the patient's history were reviewed and updated as appropriate: allergies, current medications, past family history, past medical history, past social history, past surgical history and problem list.    Review of Systems   Constitutional:  Negative for chills and fever.   HENT:  Negative for congestion, rhinorrhea and sore throat.    Respiratory:  Negative for cough and shortness of breath.    Cardiovascular:  " Negative for chest pain.   Gastrointestinal:  Negative for diarrhea, nausea and vomiting.   Genitourinary:  Positive for menstrual problem and pelvic pain.   Musculoskeletal:  Positive for back pain.   Skin:  Negative for rash.   Neurological:  Positive for headaches. Negative for dizziness.       Current Outpatient Medications on File Prior to Visit   Medication Sig   • Alcohol Swabs (Alcohol Pads) 70 % PADS Use once a week   • ibuprofen (MOTRIN) 600 mg tablet Take 1 tablet (600 mg total) by mouth every 6 (six) hours as needed for mild pain   • metFORMIN (GLUCOPHAGE) 1000 MG tablet Take 1 tablet (1,000 mg total) by mouth 2 (two) times a day with meals   • Semaglutide-Weight Management (WEGOVY) 0.25 MG/0.5ML Inject 0.5 mL (0.25 mg total) under the skin once a week       Objective     /78 (BP Location: Left arm, Patient Position: Sitting, Cuff Size: Large)   Pulse 96   Temp 97.5 °F (36.4 °C) (Temporal)   Resp 18   Ht 5' 4\" (1.626 m)   Wt 108 kg (239 lb)   SpO2 98%   BMI 41.02 kg/m²     Physical Exam  Vitals and nursing note reviewed.   Constitutional:       Appearance: She is well-developed.   HENT:      Head: Normocephalic and atraumatic.      Right Ear: External ear normal.      Left Ear: External ear normal.      Nose: Nose normal.      Mouth/Throat:      Mouth: Mucous membranes are moist.   Eyes:      Extraocular Movements: Extraocular movements intact.      Conjunctiva/sclera: Conjunctivae normal.   Cardiovascular:      Rate and Rhythm: Normal rate and regular rhythm.      Heart sounds: Normal heart sounds.   Pulmonary:      Effort: Pulmonary effort is normal. No respiratory distress.      Breath sounds: Normal breath sounds.   Abdominal:      General: Bowel sounds are normal. There is no distension.      Palpations: Abdomen is soft.   Neurological:      Mental Status: She is alert and oriented to person, place, and time.   Psychiatric:         Mood and Affect: Mood normal.         Behavior: " Behavior normal.       Lelia Singh MD

## 2024-03-07 PROBLEM — E28.2 PCOS (POLYCYSTIC OVARIAN SYNDROME): Status: ACTIVE | Noted: 2024-03-07

## 2024-03-07 PROBLEM — N93.9 ABNORMAL UTERINE BLEEDING: Status: ACTIVE | Noted: 2024-03-07

## 2024-03-07 PROBLEM — E66.9 OBESITY: Status: ACTIVE | Noted: 2022-09-20

## 2024-03-08 NOTE — ASSESSMENT & PLAN NOTE
Obtain lab work to rule out anemia  Referred for pelvic ultrasound  Start estrogen progestin pills daily

## 2024-03-11 ENCOUNTER — HOSPITAL ENCOUNTER (OUTPATIENT)
Dept: ULTRASOUND IMAGING | Facility: HOSPITAL | Age: 29
Discharge: HOME | End: 2024-03-11
Payer: COMMERCIAL

## 2024-03-11 DIAGNOSIS — N93.9 ABNORMAL UTERINE BLEEDING: ICD-10-CM

## 2024-03-11 PROCEDURE — 76856 US EXAM PELVIC COMPLETE: CPT

## 2024-03-11 PROCEDURE — 76830 TRANSVAGINAL US NON-OB: CPT

## 2024-03-13 DIAGNOSIS — R93.89 ENDOMETRIAL THICKENING ON ULTRASOUND: ICD-10-CM

## 2024-03-13 DIAGNOSIS — N93.9 ABNORMAL UTERINE BLEEDING: Primary | ICD-10-CM

## 2024-03-21 ENCOUNTER — OFFICE VISIT (OUTPATIENT)
Dept: OBGYN CLINIC | Facility: CLINIC | Age: 29
End: 2024-03-21

## 2024-03-21 VITALS
WEIGHT: 244 LBS | SYSTOLIC BLOOD PRESSURE: 119 MMHG | HEART RATE: 88 BPM | RESPIRATION RATE: 18 BRPM | BODY MASS INDEX: 44.9 KG/M2 | DIASTOLIC BLOOD PRESSURE: 82 MMHG | HEIGHT: 62 IN

## 2024-03-21 DIAGNOSIS — E28.2 PCOS (POLYCYSTIC OVARIAN SYNDROME): ICD-10-CM

## 2024-03-21 DIAGNOSIS — R93.89 ENDOMETRIAL THICKENING ON ULTRASOUND: ICD-10-CM

## 2024-03-21 DIAGNOSIS — N93.9 ABNORMAL UTERINE BLEEDING: Primary | ICD-10-CM

## 2024-03-21 PROCEDURE — 99203 OFFICE O/P NEW LOW 30 MIN: CPT | Performed by: NURSE PRACTITIONER

## 2024-03-21 RX ORDER — NORGESTREL-ETHINYL ESTRADIOL 0.3-0.03MG
1 TABLET ORAL DAILY
Qty: 28 TABLET | Refills: 3 | Status: SHIPPED | OUTPATIENT
Start: 2024-03-21 | End: 2024-06-14

## 2024-03-21 NOTE — PROGRESS NOTES
Assessment/Plan:    No problem-specific Assessment & Plan notes found for this encounter.       Diagnoses and all orders for this visit:    Abnormal uterine bleeding  -     Ambulatory Referral to Obstetrics / Gynecology  -     norgestrel-ethinyl estradiol (Cryselle-28) 0.3 mg-30 mcg per tablet; Take 1 tablet by mouth daily  Recommend follow-up in 2 months, reviewed to call if bleeding not improved within the next 2 wks  PCOS (polycystic ovarian syndrome)  -     norgestrel-ethinyl estradiol (Cryselle-28) 0.3 mg-30 mcg per tablet; Take 1 tablet by mouth daily  Pt has f/u with weight management, she is considering weight loss surgery.  Information provided on PCOS and OCP's    Endometrial thickening on ultrasound  -     Ambulatory Referral to Obstetrics / Gynecology          Subjective:      Patient ID: Diana Johnson is a 28 y.o. female.    HPI 28-year-old here with AUB.  Swedish speaking patient interpretation done by her .  Patient is new to us.  Patient with history of PCOS  Patient was seen by family medicine 3/6/2024 and was to get blood work, ultrasound and start OCPs.  She was started on Loestrin FE .  At that appointment she was having a history of heavy menstrual bleeding that was prolonged.  She has history of menorrhagia since age 12 but increased flow and frequency started about 4 months ago.  She reports she will get periods 2 times a month lasting about 8-10 days , she changes her pad about every 1 hour when it is heavy, can be heavy for 4 to 5 days..history of .  Started taking Loestrin but reports her bleeding has been creased also having more cramps.  She had a Pap with HPV done 2024 that was negative.  Lab work, done 3/6/2024, hemoglobin was 13.5, platelets 301K, she had normal ferritin of 30, CMP normal, pro time, INR and APTT all within normal.  She had a pelvic ultrasound that was 3/11/2024, her uterus and 10.3 x 6.0 x 5.5 cm, her endometrial complex was 23.0  "mildly heterogenous, no mass identified, ovaries were normal.   She is considering weight loss surgery and has appointment scheduled.We discussed PCOS, anovulation, health risks related to PCOS, and weight loss.  Currently  she is on metformin.  This pregnancy was through IUI    The following portions of the patient's history were reviewed and updated as appropriate: allergies, current medications, past family history, past medical history, past social history, past surgical history, and problem list.    Review of Systems   Constitutional:  Negative for chills and fever.   Respiratory: Negative.     Cardiovascular: Negative.    Gastrointestinal: Negative.    Genitourinary:  Positive for menstrual problem.         Objective:      /82 (BP Location: Right arm, Patient Position: Sitting, Cuff Size: Adult)   Pulse 88   Resp 18   Ht 5' 2\" (1.575 m)   Wt 111 kg (244 lb)   LMP 03/16/2024 (Exact Date)   BMI 44.63 kg/m²          Physical Exam  Constitutional:       Appearance: Normal appearance.   Cardiovascular:      Rate and Rhythm: Normal rate.   Pulmonary:      Effort: Pulmonary effort is normal.   Abdominal:      Palpations: Abdomen is soft.      Tenderness: There is no abdominal tenderness.           "

## 2024-03-25 ENCOUNTER — CLINICAL SUPPORT (OUTPATIENT)
Dept: BARIATRICS | Facility: CLINIC | Age: 29
End: 2024-03-25

## 2024-03-25 VITALS — WEIGHT: 243 LBS | HEIGHT: 64 IN | BODY MASS INDEX: 41.48 KG/M2

## 2024-03-25 DIAGNOSIS — E66.01 MORBID (SEVERE) OBESITY DUE TO EXCESS CALORIES (HCC): Primary | ICD-10-CM

## 2024-03-25 DIAGNOSIS — E66.01 CLASS 3 SEVERE OBESITY DUE TO EXCESS CALORIES WITHOUT SERIOUS COMORBIDITY WITH BODY MASS INDEX (BMI) OF 40.0 TO 44.9 IN ADULT (HCC): ICD-10-CM

## 2024-03-25 PROCEDURE — RECHECK

## 2024-03-25 NOTE — PROGRESS NOTES
Spoke to patient on the phone: with  Graham/[id number]    Patient is interested in the bariatric surgical process. Patient qualifies for surgery with current comorbidities and BMI. Discussed the entire surgical workup process and all requirements of [facility] program and patient's insurance. Answered all questions at the time of the phone call. All SW/RD questions redirected to the next appointment, the 2-hour evaluation.      Patient verbalized understanding of the surgical process at [facility] and had no further questions at this time.

## 2024-04-08 ENCOUNTER — TELEPHONE (OUTPATIENT)
Dept: BARIATRICS | Facility: CLINIC | Age: 29
End: 2024-04-08

## 2024-04-10 DIAGNOSIS — N93.9 ABNORMAL UTERINE BLEEDING: ICD-10-CM

## 2024-04-10 DIAGNOSIS — E28.2 PCOS (POLYCYSTIC OVARIAN SYNDROME): ICD-10-CM

## 2024-04-11 RX ORDER — NORGESTREL AND ETHINYL ESTRADIOL 0.3-0.03MG
1 KIT ORAL DAILY
Qty: 84 TABLET | Refills: 0 | Status: SHIPPED | OUTPATIENT
Start: 2024-04-11

## 2024-05-20 ENCOUNTER — OFFICE VISIT (OUTPATIENT)
Dept: OBGYN CLINIC | Facility: CLINIC | Age: 29
End: 2024-05-20

## 2024-05-20 VITALS
WEIGHT: 239.4 LBS | BODY MASS INDEX: 44.05 KG/M2 | DIASTOLIC BLOOD PRESSURE: 81 MMHG | HEIGHT: 62 IN | HEART RATE: 99 BPM | SYSTOLIC BLOOD PRESSURE: 121 MMHG

## 2024-05-20 DIAGNOSIS — N93.9 ABNORMAL UTERINE BLEEDING: ICD-10-CM

## 2024-05-20 DIAGNOSIS — E28.2 PCOS (POLYCYSTIC OVARIAN SYNDROME): ICD-10-CM

## 2024-05-20 PROCEDURE — 99213 OFFICE O/P EST LOW 20 MIN: CPT | Performed by: NURSE PRACTITIONER

## 2024-05-20 RX ORDER — NORGESTREL AND ETHINYL ESTRADIOL 0.3-0.03MG
1 KIT ORAL DAILY
Qty: 84 TABLET | Refills: 2 | Status: SHIPPED | OUTPATIENT
Start: 2024-05-20

## 2024-05-20 NOTE — PROGRESS NOTES
Ambulatory Visit  Name: Diana Johnson      : 1995      MRN: [id number]  Encounter Provider: ALYSE Rehman  Encounter Date: 2024   Encounter department: [facility]    Annual due 25    Assessment & Plan   1. Abnormal uterine bleeding-resolved  -     norgestrel-ethinyl estradiol (Low-Ogestrel) 0.3 mg-30 mcg per tablet; Take 1 tablet by mouth daily  Her menses are now regular, desires to continue OCPs.  Reviewed to call if becoming irregular.  Return to office 2025 for annual GYN exam or any other concerns.  Reviewed [facility]  2. PCOS (polycystic ovarian syndrome)  -     norgestrel-ethinyl estradiol (Low-Ogestrel) 0.3 mg-30 mcg per tablet; Take 1 tablet by mouth daily  Information provided also on AVS concerning PCOS    History of Present Illness     Diana Johnson is a 29 y.o. female who presents for  follow-up of menses.   used .  She has history of PCOS.  She was prescribed Cryselle on 3/21/2024.  Previously was started on Loestrin FE  by family medicine.  At that appointment she was having prolonged heavy bleeding for the past 4 months.  History of menorrhagia since age 12 sometimes will get 2 periods per month lasting 8 to 10 days.  She had lab work done 3/6/2024, hemoglobin was 13.5, platelets were 301K she had normal ferritin, CMP normal, pro time, INR, and APTT all within normal.  She had an ultrasound done 3/11/2024 her endometrial complex was 23.0 mm mildly heterogenous, no mass identified ovaries were normal.  She was considering weight loss surgery, she is undecided at this time.  Reviewed if she did have bariatric surgery she would need to use another type of contraception. Her  past pregnancy through IUI, she has a 3 yo at home. .   Her LMP was 24, she reports her menses are now regular with OCPs,, every month and last 4 days.  Menstrual flow is now more moderate, and she  "denies any cramps.  She is happy with the pill and desires to continue she denies any [facility] ES symptoms.  Reviewed with patient to return to office if her menses become irregular or prolonged.     She has follow-up with her PCP in 3 months    Review of Systems   Constitutional:  Negative for chills and fever.   Eyes:  Negative for photophobia and visual disturbance.   Respiratory: Negative.     Cardiovascular: Negative.    Genitourinary:  Positive for pelvic pain. Negative for menstrual problem.   Neurological:  Negative for dizziness and headaches.     Pertinent Medical History         Medical History Reviewed by provider this encounter:  Tobacco  Problems  Med Hx  Surg Hx  Fam Hx  Soc Hx      Objective     /81   Pulse 99   Ht 5' 2\" (1.575 m)   Wt 109 kg (239 lb 6.4 oz)   LMP 05/13/2024   BMI 43.79 kg/m²     Physical Exam  Cardiovascular:      Rate and Rhythm: Normal rate.   Pulmonary:      Effort: Pulmonary effort is normal.   Abdominal:      Palpations: Abdomen is soft.      Tenderness: There is no abdominal tenderness.       Administrative Statements           "

## 2025-02-05 ENCOUNTER — OFFICE VISIT (OUTPATIENT)
Dept: FAMILY MEDICINE CLINIC | Facility: CLINIC | Age: 30
End: 2025-02-05

## 2025-02-05 VITALS
DIASTOLIC BLOOD PRESSURE: 60 MMHG | BODY MASS INDEX: 36.4 KG/M2 | RESPIRATION RATE: 18 BRPM | OXYGEN SATURATION: 97 % | HEIGHT: 62 IN | WEIGHT: 197.8 LBS | HEART RATE: 76 BPM | TEMPERATURE: 97.8 F | SYSTOLIC BLOOD PRESSURE: 116 MMHG

## 2025-02-05 DIAGNOSIS — Z11.4 SCREENING FOR HIV (HUMAN IMMUNODEFICIENCY VIRUS): ICD-10-CM

## 2025-02-05 DIAGNOSIS — Z31.69 INFERTILITY COUNSELING: ICD-10-CM

## 2025-02-05 DIAGNOSIS — Z00.00 ANNUAL PHYSICAL EXAM: Primary | ICD-10-CM

## 2025-02-05 DIAGNOSIS — Z11.59 NEED FOR HEPATITIS C SCREENING TEST: ICD-10-CM

## 2025-02-05 PROCEDURE — 99395 PREV VISIT EST AGE 18-39: CPT | Performed by: FAMILY MEDICINE

## 2025-02-05 RX ORDER — PNV NO.95/FERROUS FUM/FOLIC AC 28MG-0.8MG
27 TABLET ORAL
Qty: 90 TABLET | Refills: 2 | Status: SHIPPED | OUTPATIENT
Start: 2025-02-05 | End: 2025-05-06

## 2025-02-05 NOTE — PROGRESS NOTES
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 Appropriate education was printed on patient's after visit summary.    Counseling:  Alcohol/drug use: discussed moderation in alcohol intake, the recommendations for healthy alcohol use, and avoidance of illicit drug use.  Dental Health: discussed importance of regular tooth brushing, flossing, and dental visits.  Sexual health: discussed sexually transmitted diseases, partner selection, use of condoms, avoidance of unintended pregnancy, and contraceptive alternatives.  Exercise: the importance of regular exercise/physical activity was discussed. Recommend exercise 3-5 times per week for at least 30 minutes.     BMI Counseling: Body mass index is 36.18 kg/m². The BMI is above normal. Nutrition recommendations include decreasing portion sizes, decreasing fast food intake, limiting drinks that contain sugar and increasing intake of lean protein. Exercise recommendations include exercising 3-5 times per week. No pharmacotherapy was ordered. Rationale for BMI follow-up plan is due to patient being  overweight or obese.     Depression Screening and Follow-up Plan: Patient was screened for depression during today's encounter. They screened negative with a PHQ-2 score of 0.        History of Present Illness     Adult Annual Physical:  Patient presents for annual physical. 29 y.o female presenting today for annual physical. Patient reports to feel well and has no complaints today.   Patient lives with  and 4-year-old son.  Denies have personal issues at this time.  She is trying to conceive, but  has history of infertility.  She reports that his last session was conceived through IVF in [place].  Patient would like to have a referral for infertility clinic here in [place].  Patient is not taking prenatal vitamins yet. .     Diet and Physical Activity:  - Diet/Nutrition: well balanced diet. States that she is trying to eat healthier recently.  Avoidin carbs and fatty foods  - Exercise: no formal exercise. Works in a [employer]    Depression Screening:  - PHQ-2 Score: 0    General Health:  - Sleep: 7-8 hours of sleep on average.  - Hearing: normal hearing bilateral ears.  - Vision: wears contacts and most recent eye exam < 1 year ago.  - Dental: regular dental visits and brushes teeth three times daily.    /GYN Health:  - Follows with GYN: yes.   - Menopause: premenopausal.   - Last menstrual cycle: 1/29/2025.   - History of STDs: no  - Contraception:. is trying to concieve.  is infertile. Patient is requiring referral to infertility clinic      Review of Systems   Constitutional:  Negative for chills and fever.   HENT:  Negative for ear pain and sore throat.    Eyes:  Negative for visual disturbance.   Respiratory:  Negative for cough and shortness of breath.    Cardiovascular:  Negative for chest pain and palpitations.   Gastrointestinal:  Negative for abdominal pain, diarrhea, nausea and vomiting.   Genitourinary:  Negative for dysuria, hematuria, pelvic pain, vaginal bleeding, vaginal  "discharge and vaginal pain.   Skin:  Negative for rash.   Neurological:  Negative for headaches.   All other systems reviewed and are negative.        Objective   /60 (BP Location: Right arm, Patient Position: Sitting, Cuff Size: Large)   Pulse 76   Temp 97.8 °F (36.6 °C) (Temporal)   Resp 18   Ht 5' 2\" (1.575 m)   Wt 89.7 kg (197 lb 12.8 oz)   LMP 01/29/2025 (Approximate)   SpO2 97%   BMI 36.18 kg/m²     Physical Exam  Constitutional:       General: She is not in acute distress.     Appearance: She is not ill-appearing.   HENT:      Head: Normocephalic and atraumatic.      Right Ear: Tympanic membrane and external ear normal. There is no impacted cerumen.      Left Ear: Tympanic membrane and external ear normal. There is no impacted cerumen.      Nose: Nose normal.      Mouth/Throat:      Mouth: Mucous membranes are moist.      Pharynx: Oropharynx is clear. No oropharyngeal exudate or posterior oropharyngeal erythema.   Eyes:      General: No scleral icterus.     Conjunctiva/sclera: Conjunctivae normal.   Cardiovascular:      Rate and Rhythm: Normal rate and regular rhythm.      Pulses: Normal pulses.      Heart sounds: No murmur heard.     No gallop.   Pulmonary:      Effort: Pulmonary effort is normal. No respiratory distress.      Breath sounds: No wheezing or rhonchi.   Abdominal:      General: Bowel sounds are normal. There is no distension.      Palpations: Abdomen is soft.      Tenderness: There is no abdominal tenderness. There is no guarding.   Musculoskeletal:      Cervical back: Normal range of motion.      Right lower leg: No edema.      Left lower leg: No edema.   Skin:     General: Skin is warm and dry.      Capillary Refill: Capillary refill takes less than 2 seconds.   Neurological:      General: No focal deficit present.      Mental Status: She is alert and oriented to person, place, and time.   Psychiatric:         Mood and Affect: Mood normal.         Behavior: Behavior normal. "

## 2025-04-14 ENCOUNTER — APPOINTMENT (OUTPATIENT)
Dept: LAB | Facility: CLINIC | Age: 30
End: 2025-04-14
Payer: COMMERCIAL

## 2025-04-14 DIAGNOSIS — Z11.4 SCREENING FOR HIV (HUMAN IMMUNODEFICIENCY VIRUS): ICD-10-CM

## 2025-04-14 DIAGNOSIS — Z11.59 NEED FOR HEPATITIS C SCREENING TEST: ICD-10-CM

## 2025-04-14 PROCEDURE — 86803 HEPATITIS C AB TEST: CPT

## 2025-04-14 PROCEDURE — 36415 COLL VENOUS BLD VENIPUNCTURE: CPT

## 2025-04-14 PROCEDURE — 87389 HIV-1 AG W/HIV-1&-2 AB AG IA: CPT

## 2025-04-15 LAB
HCV AB SER QL: NORMAL
HIV 1+2 AB+HIV1 P24 AG SERPL QL IA: NORMAL

## 2025-04-17 ENCOUNTER — RESULTS FOLLOW-UP (OUTPATIENT)
Dept: FAMILY MEDICINE CLINIC | Facility: CLINIC | Age: 30
End: 2025-04-17